# Patient Record
Sex: FEMALE | Race: WHITE | Employment: FULL TIME | URBAN - METROPOLITAN AREA
[De-identification: names, ages, dates, MRNs, and addresses within clinical notes are randomized per-mention and may not be internally consistent; named-entity substitution may affect disease eponyms.]

---

## 2017-02-20 NOTE — H&P
Gynecology History and Physical    Name: Cheryle Rhodes MRN: [id number] SSN: [id number]    YOB: 1985  Age: 27 y.o. Sex: female       Subjective:      Annia Rivas is a 32yo  at 9 weeks gestational age with missed . Presented for NOB visit and US with 6w CRL and no fetal cardiac activity. Returned for f/u US and no interval growth. Tried cytotec but did not experience any bleeding. Have decided to proceed with D&C. OB History     Previous  at term        Medical history: none  Surgical history: none  Social History     Occupational History    teacher     Social History Main Topics    Smoking status: Non-smoker    Smokeless tobacco:     Alcohol use     Drug use:     Sexual activity:      Family history: non-contributory     Allergies not on file  Prior to Admission medications    NKDA        Review of Systems:  A comprehensive review of systems was negative except for that written in the History of Present Illness. Objective: There were no vitals filed for this visit. Physical Exam:  Patient without distress. Heart: Regular rate and rhythm  Lung: clear to auscultation throughout lung fields, no wheezes, no rales, no rhonchi and normal respiratory effort  Abdomen: soft, nontender  External Genitalia: normal general appearance  Urinary system: urethral meatus normal  Vagina: normal mucosa without prolapse or lesions  Cervix: normal appearance  Adnexa: normal bimanual exam  Extr: nontender    Assessment:     Missed  at 10.4 weeks with 6w CRL and enlarged irregular gestational sac    Plan:     Procedure(s) (LRB):  DILATATION AND CURETTAGE WITH SUCTION (N/A)  Discussed the risks of surgery including the risks of bleeding, infection, deep vein thrombosis, and surgical injuries to internal organs including but not limited to the bowels, bladder, rectum, and female reproductive organs. Reviewed risk of uterine perforation.  The patient understands the risks; any and all questions were answered to the patient's satisfaction.     Signed By:  Abdoulaye Richardson MD     February 20, 2017

## 2017-02-21 RX ORDER — SODIUM CHLORIDE, SODIUM LACTATE, POTASSIUM CHLORIDE, CALCIUM CHLORIDE 600; 310; 30; 20 MG/100ML; MG/100ML; MG/100ML; MG/100ML
150 INJECTION, SOLUTION INTRAVENOUS CONTINUOUS
Status: CANCELLED | OUTPATIENT
Start: 2017-02-23 | End: 2017-02-24

## 2017-02-21 RX ORDER — SODIUM CHLORIDE 0.9 % (FLUSH) 0.9 %
5-10 SYRINGE (ML) INJECTION AS NEEDED
Status: CANCELLED | OUTPATIENT
Start: 2017-02-21

## 2017-02-21 RX ORDER — SODIUM CHLORIDE 0.9 % (FLUSH) 0.9 %
5-10 SYRINGE (ML) INJECTION EVERY 8 HOURS
Status: CANCELLED | OUTPATIENT
Start: 2017-02-21

## 2017-02-22 ENCOUNTER — ANESTHESIA EVENT (OUTPATIENT)
Dept: SURGERY | Age: 32
End: 2017-02-22
Payer: COMMERCIAL

## 2017-02-23 ENCOUNTER — HOSPITAL ENCOUNTER (OUTPATIENT)
Age: 32
Setting detail: OUTPATIENT SURGERY
Discharge: HOME | End: 2017-02-23
Attending: OBSTETRICS & GYNECOLOGY | Admitting: OBSTETRICS & GYNECOLOGY
Payer: COMMERCIAL

## 2017-02-23 ENCOUNTER — ANESTHESIA (OUTPATIENT)
Dept: SURGERY | Age: 32
End: 2017-02-23
Payer: COMMERCIAL

## 2017-02-23 ENCOUNTER — SURGERY (OUTPATIENT)
Age: 32
End: 2017-02-23

## 2017-02-23 VITALS
SYSTOLIC BLOOD PRESSURE: 90 MMHG | DIASTOLIC BLOOD PRESSURE: 63 MMHG | WEIGHT: 107 LBS | HEIGHT: 61 IN | BODY MASS INDEX: 20.2 KG/M2 | HEART RATE: 79 BPM | TEMPERATURE: 97.4 F | OXYGEN SATURATION: 100 % | RESPIRATION RATE: 16 BRPM

## 2017-02-23 LAB
BASOPHILS # BLD AUTO: 0 K/UL (ref 0–0.06)
BASOPHILS # BLD: 1 % (ref 0–2)
DIFFERENTIAL METHOD BLD: NORMAL
EOSINOPHIL # BLD: 0.1 K/UL (ref 0–0.4)
EOSINOPHIL NFR BLD: 1 % (ref 0–5)
ERYTHROCYTE [DISTWIDTH] IN BLOOD BY AUTOMATED COUNT: 13 % (ref 11.6–14.5)
HCT VFR BLD AUTO: 39.5 % (ref 35–45)
HGB BLD-MCNC: 13.5 G/DL (ref 12–16)
LYMPHOCYTES # BLD AUTO: 30 % (ref 21–52)
LYMPHOCYTES # BLD: 2.4 K/UL (ref 0.9–3.6)
MCH RBC QN AUTO: 29 PG (ref 24–34)
MCHC RBC AUTO-ENTMCNC: 34.2 G/DL (ref 31–37)
MCV RBC AUTO: 84.8 FL (ref 74–97)
MONOCYTES # BLD: 0.4 K/UL (ref 0.05–1.2)
MONOCYTES NFR BLD AUTO: 5 % (ref 3–10)
NEUTS SEG # BLD: 5 K/UL (ref 1.8–8)
NEUTS SEG NFR BLD AUTO: 63 % (ref 40–73)
PLATELET # BLD AUTO: 369 K/UL (ref 135–420)
PMV BLD AUTO: 9.4 FL (ref 9.2–11.8)
RBC # BLD AUTO: 4.66 M/UL (ref 4.2–5.3)
WBC # BLD AUTO: 8 K/UL (ref 4.6–13.2)

## 2017-02-23 PROCEDURE — 86900 BLOOD TYPING SEROLOGIC ABO: CPT | Performed by: OBSTETRICS & GYNECOLOGY

## 2017-02-23 PROCEDURE — 76010000154 HC OR TIME FIRST 0.5 HR: Performed by: OBSTETRICS & GYNECOLOGY

## 2017-02-23 PROCEDURE — 77030032490 HC SLV COMPR SCD KNE COVD -B: Performed by: OBSTETRICS & GYNECOLOGY

## 2017-02-23 PROCEDURE — 77030020164: Performed by: OBSTETRICS & GYNECOLOGY

## 2017-02-23 PROCEDURE — 77030008577 HC TBNG UTER SUC OCOA -A: Performed by: OBSTETRICS & GYNECOLOGY

## 2017-02-23 PROCEDURE — 77030018842 HC SOL IRR SOD CL 9% BAXT -A: Performed by: OBSTETRICS & GYNECOLOGY

## 2017-02-23 PROCEDURE — 85025 COMPLETE CBC W/AUTO DIFF WBC: CPT | Performed by: OBSTETRICS & GYNECOLOGY

## 2017-02-23 PROCEDURE — 74011250636 HC RX REV CODE- 250/636

## 2017-02-23 PROCEDURE — 74011250636 HC RX REV CODE- 250/636: Performed by: NURSE ANESTHETIST, CERTIFIED REGISTERED

## 2017-02-23 PROCEDURE — 74011250637 HC RX REV CODE- 250/637: Performed by: NURSE ANESTHETIST, CERTIFIED REGISTERED

## 2017-02-23 PROCEDURE — 76210000021 HC REC RM PH II 0.5 TO 1 HR: Performed by: OBSTETRICS & GYNECOLOGY

## 2017-02-23 PROCEDURE — 76210000006 HC OR PH I REC 0.5 TO 1 HR: Performed by: OBSTETRICS & GYNECOLOGY

## 2017-02-23 PROCEDURE — 36415 COLL VENOUS BLD VENIPUNCTURE: CPT | Performed by: OBSTETRICS & GYNECOLOGY

## 2017-02-23 PROCEDURE — 77030012510 HC MSK AIRWY LMA TELE -B: Performed by: ANESTHESIOLOGY

## 2017-02-23 PROCEDURE — 88305 TISSUE EXAM BY PATHOLOGIST: CPT | Performed by: OBSTETRICS & GYNECOLOGY

## 2017-02-23 PROCEDURE — 74011250636 HC RX REV CODE- 250/636: Performed by: OBSTETRICS & GYNECOLOGY

## 2017-02-23 PROCEDURE — 76060000031 HC ANESTHESIA FIRST 0.5 HR: Performed by: OBSTETRICS & GYNECOLOGY

## 2017-02-23 PROCEDURE — 74011000250 HC RX REV CODE- 250

## 2017-02-23 RX ORDER — FENTANYL CITRATE 50 UG/ML
25 INJECTION, SOLUTION INTRAMUSCULAR; INTRAVENOUS AS NEEDED
Status: DISCONTINUED | OUTPATIENT
Start: 2017-02-23 | End: 2017-02-23 | Stop reason: HOSPADM

## 2017-02-23 RX ORDER — FAMOTIDINE 20 MG/1
20 TABLET, FILM COATED ORAL ONCE
Status: COMPLETED | OUTPATIENT
Start: 2017-02-23 | End: 2017-02-23

## 2017-02-23 RX ORDER — SODIUM CHLORIDE 0.9 % (FLUSH) 0.9 %
5-10 SYRINGE (ML) INJECTION EVERY 8 HOURS
Status: DISCONTINUED | OUTPATIENT
Start: 2017-02-23 | End: 2017-02-23 | Stop reason: HOSPADM

## 2017-02-23 RX ORDER — SODIUM CHLORIDE, SODIUM LACTATE, POTASSIUM CHLORIDE, CALCIUM CHLORIDE 600; 310; 30; 20 MG/100ML; MG/100ML; MG/100ML; MG/100ML
25 INJECTION, SOLUTION INTRAVENOUS CONTINUOUS
Status: DISCONTINUED | OUTPATIENT
Start: 2017-02-23 | End: 2017-02-23 | Stop reason: HOSPADM

## 2017-02-23 RX ORDER — KETOROLAC TROMETHAMINE 30 MG/ML
INJECTION, SOLUTION INTRAMUSCULAR; INTRAVENOUS AS NEEDED
Status: DISCONTINUED | OUTPATIENT
Start: 2017-02-23 | End: 2017-02-23 | Stop reason: HOSPADM

## 2017-02-23 RX ORDER — ONDANSETRON 2 MG/ML
INJECTION INTRAMUSCULAR; INTRAVENOUS AS NEEDED
Status: DISCONTINUED | OUTPATIENT
Start: 2017-02-23 | End: 2017-02-23 | Stop reason: HOSPADM

## 2017-02-23 RX ORDER — MIDAZOLAM HYDROCHLORIDE 1 MG/ML
INJECTION, SOLUTION INTRAMUSCULAR; INTRAVENOUS AS NEEDED
Status: DISCONTINUED | OUTPATIENT
Start: 2017-02-23 | End: 2017-02-23 | Stop reason: HOSPADM

## 2017-02-23 RX ORDER — SODIUM CHLORIDE 0.9 % (FLUSH) 0.9 %
5-10 SYRINGE (ML) INJECTION AS NEEDED
Status: DISCONTINUED | OUTPATIENT
Start: 2017-02-23 | End: 2017-02-23 | Stop reason: HOSPADM

## 2017-02-23 RX ORDER — DEXAMETHASONE SODIUM PHOSPHATE 4 MG/ML
INJECTION, SOLUTION INTRA-ARTICULAR; INTRALESIONAL; INTRAMUSCULAR; INTRAVENOUS; SOFT TISSUE AS NEEDED
Status: DISCONTINUED | OUTPATIENT
Start: 2017-02-23 | End: 2017-02-23 | Stop reason: HOSPADM

## 2017-02-23 RX ORDER — SODIUM CHLORIDE, SODIUM LACTATE, POTASSIUM CHLORIDE, CALCIUM CHLORIDE 600; 310; 30; 20 MG/100ML; MG/100ML; MG/100ML; MG/100ML
150 INJECTION, SOLUTION INTRAVENOUS CONTINUOUS
Status: DISCONTINUED | OUTPATIENT
Start: 2017-02-23 | End: 2017-02-23 | Stop reason: HOSPADM

## 2017-02-23 RX ORDER — FENTANYL CITRATE 50 UG/ML
INJECTION, SOLUTION INTRAMUSCULAR; INTRAVENOUS AS NEEDED
Status: DISCONTINUED | OUTPATIENT
Start: 2017-02-23 | End: 2017-02-23 | Stop reason: HOSPADM

## 2017-02-23 RX ORDER — FENTANYL CITRATE 50 UG/ML
50 INJECTION, SOLUTION INTRAMUSCULAR; INTRAVENOUS
Status: DISCONTINUED | OUTPATIENT
Start: 2017-02-23 | End: 2017-02-23 | Stop reason: HOSPADM

## 2017-02-23 RX ORDER — LIDOCAINE HYDROCHLORIDE 20 MG/ML
INJECTION, SOLUTION EPIDURAL; INFILTRATION; INTRACAUDAL; PERINEURAL AS NEEDED
Status: DISCONTINUED | OUTPATIENT
Start: 2017-02-23 | End: 2017-02-23 | Stop reason: HOSPADM

## 2017-02-23 RX ORDER — SODIUM CHLORIDE, SODIUM LACTATE, POTASSIUM CHLORIDE, CALCIUM CHLORIDE 600; 310; 30; 20 MG/100ML; MG/100ML; MG/100ML; MG/100ML
50 INJECTION, SOLUTION INTRAVENOUS CONTINUOUS
Status: DISCONTINUED | OUTPATIENT
Start: 2017-02-23 | End: 2017-02-23 | Stop reason: HOSPADM

## 2017-02-23 RX ORDER — ONDANSETRON 2 MG/ML
4 INJECTION INTRAMUSCULAR; INTRAVENOUS ONCE
Status: DISCONTINUED | OUTPATIENT
Start: 2017-02-23 | End: 2017-02-23 | Stop reason: HOSPADM

## 2017-02-23 RX ORDER — PROPOFOL 10 MG/ML
INJECTION, EMULSION INTRAVENOUS AS NEEDED
Status: DISCONTINUED | OUTPATIENT
Start: 2017-02-23 | End: 2017-02-23 | Stop reason: HOSPADM

## 2017-02-23 RX ADMIN — FENTANYL CITRATE 50 MCG: 50 INJECTION, SOLUTION INTRAMUSCULAR; INTRAVENOUS at 11:49

## 2017-02-23 RX ADMIN — SODIUM CHLORIDE, SODIUM LACTATE, POTASSIUM CHLORIDE, AND CALCIUM CHLORIDE 25 ML/HR: 600; 310; 30; 20 INJECTION, SOLUTION INTRAVENOUS at 11:31

## 2017-02-23 RX ADMIN — SODIUM CHLORIDE: 900 INJECTION, SOLUTION INTRAVENOUS at 11:57

## 2017-02-23 RX ADMIN — DEXAMETHASONE SODIUM PHOSPHATE 4 MG: 4 INJECTION, SOLUTION INTRA-ARTICULAR; INTRALESIONAL; INTRAMUSCULAR; INTRAVENOUS; SOFT TISSUE at 11:54

## 2017-02-23 RX ADMIN — MIDAZOLAM HYDROCHLORIDE 2 MG: 1 INJECTION, SOLUTION INTRAMUSCULAR; INTRAVENOUS at 11:42

## 2017-02-23 RX ADMIN — PROPOFOL 150 MG: 10 INJECTION, EMULSION INTRAVENOUS at 11:49

## 2017-02-23 RX ADMIN — KETOROLAC TROMETHAMINE 30 MG: 30 INJECTION, SOLUTION INTRAMUSCULAR; INTRAVENOUS at 12:02

## 2017-02-23 RX ADMIN — ONDANSETRON 4 MG: 2 INJECTION INTRAMUSCULAR; INTRAVENOUS at 11:54

## 2017-02-23 RX ADMIN — LIDOCAINE HYDROCHLORIDE 60 MG: 20 INJECTION, SOLUTION EPIDURAL; INFILTRATION; INTRACAUDAL; PERINEURAL at 11:49

## 2017-02-23 RX ADMIN — FAMOTIDINE 20 MG: 20 TABLET ORAL at 11:32

## 2017-02-23 RX ADMIN — FENTANYL CITRATE 50 MCG: 50 INJECTION, SOLUTION INTRAMUSCULAR; INTRAVENOUS at 11:56

## 2017-02-23 NOTE — DISCHARGE INSTRUCTIONS
Call Dr Hooper [place] [phone] if bleeding through more that 4 pads an hour, fever higher than 101 or abdominal pain with nausea and vomiting. Dilation and Curettage: What to Expect at Home  Your Recovery  Dilation and curettage (D&C) is a procedure to remove tissue from the inside of the uterus. The doctor used a curved tool, called a curette, to gently scrape tissue from your uterus. You are likely to have a backache, or cramps similar to menstrual cramps, and pass small clots of blood from your vagina for the first few days. You may continue to have light vaginal bleeding for several weeks after the procedure. You will probably be able to go back to most of your normal activities in 1 or 2 days. This care sheet gives you a general idea about how long it will take for you to recover. But each person recovers at a different pace. Follow the steps below to get better as quickly as possible. How can you care for yourself at home? Activity  · Rest when you feel tired. Getting enough sleep will help you recover. · Avoid strenuous activities, such as bicycle riding, jogging, weight lifting, or aerobic exercise, until your doctor says it is okay. · Most women are able to return to work the day after the procedure. · You may have some light vaginal bleeding. Wear sanitary pads if needed. Do not douche or use tampons for 2 weeks or until your doctor says it is okay. · Ask your doctor when it is okay for you to have sex. · If you could become pregnant, talk about birth control with your doctor. Do not try to become pregnant until your doctor says it is okay. Diet  · You can eat your normal diet. If your stomach is upset, try bland, low-fat foods like plain rice, broiled chicken, toast, and yogurt. · Drink plenty of fluids (unless your doctor tells you not to). Medicines  · Your doctor will tell you if and when you can restart your medicines.  He or she will also give you instructions about taking any new medicines. · If you take blood thinners, such as warfarin (Coumadin), clopidogrel (Plavix), or aspirin, be sure to talk to your doctor. He or she will tell you if and when to start taking those medicines again. Make sure that you understand exactly what your doctor wants you to do. · Be safe with medicines. Take pain medicines exactly as directed. ¨ If the doctor gave you a prescription medicine for pain, take it as prescribed. ¨ If you are not taking a prescription pain medicine, ask your doctor if you can take an over-the-counter medicine. · If you think your pain medicine is making you sick to your stomach:  ¨ Take your medicine after meals (unless your doctor has told you not to). ¨ Ask your doctor for a different pain medicine. · If your doctor prescribed antibiotics, take them as directed. Do not stop taking them just because you feel better. You need to take the full course of antibiotics. Follow-up care is a key part of your treatment and safety. Be sure to make and go to all appointments, and call your doctor if you are having problems. It's also a good idea to know your test results and keep a list of the medicines you take. When should you call for help? Call 911 anytime you think you may need emergency care. For example, call if:  · You passed out (lost consciousness). · You have severe trouble breathing. · You have chest pain and shortness of breath, or you cough up blood. · You have severe pain in your belly. Call your doctor now or seek immediate medical care if:  · You have bright red vaginal bleeding that soaks one or more pads each hour for 2 or more hours. · You pass blood clots that are larger than a golf ball. · You have vaginal discharge that smells bad. · You are sick to your stomach or cannot keep fluids down. · You have pain that does not get better after you take pain medicine. · You have pain that is getting worse 2 days after the procedure.   · You have a fever over 100°F.  · Your belly feels tender, or full and hard. Watch closely for changes in your health, and be sure to contact your doctor if:  · You do not get better as expected. Where can you learn more? Go to http://gene-[facility].info/. Enter [phone] in the search box to learn more about \"Dilation and Curettage: What to Expect at Home. \"  Current as of: May 30, 2016  Content Version: 11.1  © 7038-3831 [employer]. Care instructions adapted under license by [employer] (which disclaims liability or warranty for this information). If you have questions about a medical condition or this instruction, always ask your healthcare professional. [place] [address] any warranty or liability for your use of this information. DISCHARGE SUMMARY from Nurse    The following personal items are in your possession at time of discharge:    Dental Appliances: None  Visual Aid: Contacts     Home Medications: None  Jewelry: None  Clothing: Pants, Shirt, Footwear, Socks, Jacket/Coat, Undergarments  Other Valuables: Contact Lenses (all belongings given to , Branden Nunez)             PATIENT INSTRUCTIONS:    After general anesthesia or intravenous sedation, for 24 hours or while taking prescription Narcotics:  · Limit your activities  · Do not drive and operate hazardous machinery  · Do not make important personal or business decisions  · Do  not drink alcoholic beverages  · If you have not urinated within 8 hours after discharge, please contact your surgeon on call.     Report the following to your surgeon:  · Excessive pain, swelling, redness or odor of or around the surgical area  · Temperature over 100.5  · Nausea and vomiting lasting longer than 4 hours or if unable to take medications  · Any signs of decreased circulation or nerve impairment to extremity: change in color, persistent  numbness, tingling, coldness or increase pain  · Any questions        What to do at Home:  These are general instructions for a healthy lifestyle:    No smoking/ No tobacco products/ Avoid exposure to second hand smoke    Surgeon General's Warning:  Quitting smoking now greatly reduces serious risk to your health. Obesity, smoking, and sedentary lifestyle greatly increases your risk for illness    A healthy diet, regular physical exercise & weight monitoring are important for maintaining a healthy lifestyle    You may be retaining fluid if you have a history of heart failure or if you experience any of the following symptoms:  Weight gain of 3 pounds or more overnight or 5 pounds in a week, increased swelling in our hands or feet or shortness of breath while lying flat in bed. Please call your doctor as soon as you notice any of these symptoms; do not wait until your next office visit. Recognize signs and symptoms of STROKE:    F-face looks uneven    A-arms unable to move or move unevenly    S-speech slurred or non-existent    T-time-call 911 as soon as signs and symptoms begin-DO NOT go       Back to bed or wait to see if you get better-TIME IS BRAIN. Warning Signs of HEART ATTACK     Call 911 if you have these symptoms:   Chest discomfort. Most heart attacks involve discomfort in the center of the chest that lasts more than a few minutes, or that goes away and comes back. It can feel like uncomfortable pressure, squeezing, fullness, or pain.  Discomfort in other areas of the upper body. Symptoms can include pain or discomfort in one or both arms, the back, neck, jaw, or stomach.  Shortness of breath with or without chest discomfort.  Other signs may include breaking out in a cold sweat, nausea, or lightheadedness. Don't wait more than five minutes to call 911 - MINUTES MATTER! Fast action can save your life. Calling 911 is almost always the fastest way to get lifesaving treatment.  Emergency Medical Services staff can begin treatment when they arrive -- up to an hour sooner than if someone gets to the hospital by car. The discharge information has been reviewed with the patient. The patient verbalized understanding. Discharge medications reviewed with the patient and appropriate educational materials and side effects teaching were provided. Patient armband removed and given to patient to take home.   Patient was informed of the privacy risks if armband lost or stolen

## 2017-02-23 NOTE — OP NOTES
OPERATIVE REPORT      PREOP DIAGNOSIS: Missed  at 10 weeks  POSTOP DIAGNOSIS: Same  PROCEDURE: Suction D&C  SURGEON: Lea Morocho MD  ASSISTANT: none  EBL: 100 cc  IVFluids:500cc  ANESTHESIA: general anesthesia  FINDINGS: 10w size uterus with products of conception  DISPOSITION: stable to recovery    PROCEDURE:   Patient was taken to the OR after informed consent had been obtained. Surgery identification was completed with the patient and the OR team. She was then placed in the dorsal lithotomy position. She was prepped and draped  in a sterile fashion. A speculum was placed in the posterior vagina. The anterior lip of the cervix was grasped with an allis clamp. The uterus was sounded to 10 cm. The cervix was serially dilated with Teresa dilators. An 8mm suction curette was advanced into the uterine cavity and all products of conception removed. Sharp curettage was performed. The suction curette was then advanced again into the uterus and all remaining clot and debris removed. The allis was removed. There was good hemostasis.  At the end of the procedure all sponge, lap, needle and instrument counts were correct x 2.       Lea Morocho MD  2017

## 2017-02-23 NOTE — ANESTHESIA POSTPROCEDURE EVALUATION
Post-Anesthesia Evaluation and Assessment    Patient: Butch Montanez MRN: [id number]  SSN: [id number]    YOB: 1985  Age: 27 y.o. Sex: female       Cardiovascular Function/Vital Signs  Visit Vitals    BP (!) 89/56    Pulse 81    Temp 36.3 °C (97.4 °F)    Resp 14    Ht 5' 1\" (1.549 m)    Wt 48.5 kg (107 lb)    SpO2 100%    BMI 20.22 kg/m2       Patient is status post general anesthesia for Procedure(s):  DILATATION AND CURETTAGE WITH SUCTION. Nausea/Vomiting: None    Postoperative hydration reviewed and adequate. Pain:  Pain Scale 1: Numeric (0 - 10) (02/23/17 1228)  Pain Intensity 1: 0 (02/23/17 1228)   Managed    Neurological Status:   Neuro (WDL): Within Defined Limits (02/23/17 1228)  Neuro  Neurologic State: Drowsy (02/23/17 1209)   At baseline    Mental Status and Level of Consciousness: Alert and oriented     Pulmonary Status:   O2 Device: Room air (02/23/17 1228)   Adequate oxygenation and airway patent    Complications related to anesthesia: None    Post-anesthesia assessment completed.  No concerns    Signed By: Royer Beth CRNA     February 23, 2017

## 2017-02-23 NOTE — ANESTHESIA PREPROCEDURE EVALUATION
Anesthetic History   No history of anesthetic complications            Review of Systems / Medical History  Patient summary reviewed and pertinent labs reviewed    Pulmonary  Within defined limits                 Neuro/Psych   Within defined limits           Cardiovascular  Within defined limits                     GI/Hepatic/Renal  Within defined limits              Endo/Other  Within defined limits           Other Findings   Comments: Current Smoker? NO       Elective Surgery? Yes       Abstained from smoking 24 hours prior to anesthesia? N/A    Risk Factors for Postoperative nausea/vomiting:       History of postoperative nausea/vomiting? NO       Female? YES       Motion sickness? NO       Intended opioid administration for postoperative analgesia?   YES           Physical Exam    Airway  Mallampati: II  TM Distance: 4 - 6 cm  Neck ROM: normal range of motion   Mouth opening: Normal     Cardiovascular  Regular rate and rhythm,  S1 and S2 normal,  no murmur, click, rub, or gallop             Dental  No notable dental hx       Pulmonary                 Abdominal  Abdominal exam normal       Other Findings            Anesthetic Plan    ASA: 1  Anesthesia type: general          Induction: Intravenous  Anesthetic plan and risks discussed with: Patient

## 2017-02-23 NOTE — PERIOP NOTES
1209  Received pt. Connected pt to monitor. VSS. Assessment preformed. RN at bedside. Will continue to monitor. 1226   Called to waiting room, spoke to Chetna Nunez  - pt  - pt id number verified. Update given on pt status.

## 2017-02-23 NOTE — INTERVAL H&P NOTE
H&P Update:  Myranda Bartltet was seen and examined. History and physical has been reviewed. The patient has been examined.  There have been no significant clinical changes since the completion of the originally dated History and Physical.    Signed By: Nadia Gillespie MD     February 23, 2017 11:36 AM

## 2017-02-23 NOTE — IP AVS SNAPSHOT
Ilia Shed 
 
 
 [address] 
[phone] Patient: Guilherme Vieira MRN: [id number] :1985 You are allergic to the following No active allergies Recent Documentation Height  
  
  
  
  
  
 1.549 m Unresulted Labs Order Current Status TYPE & SCREEN Preliminary result Emergency Contacts Name Discharge Info Relation Home Work Mobile Gabe Higginbotham DISCHARGE CAREGIVER [3] Spouse [3]   [phone] About your hospitalization You were admitted on:  2017 [facility] You were discharged on:  2017 Unit phone number:  [phone] Why you were hospitalized Your primary diagnosis was:  Not on File Providers Seen During Your Hospitalizations Provider Role Specialty Primary office phone Lorraine Quintana MD Attending Provider Obstetrics & Gynecology [phone] Your Primary Care Physician (PCP) Primary Care Physician Office Phone Office Fax NONE ** None ** ** None ** Follow-up Information Follow up With Details Comments Contact Info None   None (395) Patient stated that they have no PCP Current Discharge Medication List  
  
Notice You have not been prescribed any medications. Discharge Instructions Call Dr Panda Giordano [phone] if bleeding through more that 4 pads an hour, fever higher than 101 or abdominal pain with nausea and vomiting. Dilation and Curettage: What to Expect at [facility] Your Recovery Dilation and curettage (D&C) is a procedure to remove tissue from the inside of the uterus. The doctor used a curved tool, called a curette, to gently scrape tissue from your uterus. You are likely to have a backache, or cramps similar to menstrual cramps, and pass small clots of blood from your vagina for the first few days.  You may continue to have light vaginal bleeding for several weeks after the procedure. You will probably be able to go back to most of your normal activities in 1 or 2 days. This care sheet gives you a general idea about how long it will take for you to recover. But each person recovers at a different pace. Follow the steps below to get better as quickly as possible. How can you care for yourself at home? Activity · Rest when you feel tired. Getting enough sleep will help you recover. · Avoid strenuous activities, such as bicycle riding, jogging, weight lifting, or aerobic exercise, until your doctor says it is okay. · Most women are able to return to work the day after the procedure. · You may have some light vaginal bleeding. Wear sanitary pads if needed. Do not douche or use tampons for 2 weeks or until your doctor says it is okay. · Ask your doctor when it is okay for you to have sex. · If you could become pregnant, talk about birth control with your doctor. Do not try to become pregnant until your doctor says it is okay. Diet · You can eat your normal diet. If your stomach is upset, try bland, low-fat foods like plain rice, broiled chicken, toast, and yogurt. · Drink plenty of fluids (unless your doctor tells you not to). Medicines · Your doctor will tell you if and when you can restart your medicines. He or she will also give you instructions about taking any new medicines. · If you take blood thinners, such as warfarin (Coumadin), clopidogrel (Plavix), or aspirin, be sure to talk to your doctor. He or she will tell you if and when to start taking those medicines again. Make sure that you understand exactly what your doctor wants you to do. · Be safe with medicines. Take pain medicines exactly as directed. ¨ If the doctor gave you a prescription medicine for pain, take it as prescribed.  
¨ If you are not taking a prescription pain medicine, ask your doctor if you can take an over-the-counter medicine. · If you think your pain medicine is making you sick to your stomach: 
¨ Take your medicine after meals (unless your doctor has told you not to). ¨ Ask your doctor for a different pain medicine. · If your doctor prescribed antibiotics, take them as directed. Do not stop taking them just because you feel better. You need to take the full course of antibiotics. Follow-up care is a key part of your treatment and safety. Be sure to make and go to all appointments, and call your doctor if you are having problems. It's also a good idea to know your test results and keep a list of the medicines you take. When should you call for help? Call 911 anytime you think you may need emergency care. For example, call if: 
· You passed out (lost consciousness). · You have severe trouble breathing. · You have chest pain and shortness of breath, or you cough up blood. · You have severe pain in your belly. Call your doctor now or seek immediate medical care if: 
· You have bright red vaginal bleeding that soaks one or more pads each hour for 2 or more hours. · You pass blood clots that are larger than a golf ball. · You have vaginal discharge that smells bad. · You are sick to your stomach or cannot keep fluids down. · You have pain that does not get better after you take pain medicine. · You have pain that is getting worse 2 days after the procedure. · You have a fever over 100°F. 
· Your belly feels tender, or full and hard. Watch closely for changes in your health, and be sure to contact your doctor if: 
· You do not get better as expected. Where can you learn more? Go to http://gene-layton.info/. Enter [phone] in the search box to learn more about \"Dilation and Curettage: What to Expect at Home. \" Current as of: May 30, 2016 Content Version: 11.1 © 6569-6068 [employer].  Care instructions adapted under license by [address] (which disclaims liability or warranty for this information). If you have questions about a medical condition or this instruction, always ask your healthcare professional. [address] any warranty or liability for your use of this information. DISCHARGE SUMMARY from Nurse The following personal items are in your possession at time of discharge: 
 
Dental Appliances: None Visual Aid: Contacts Home Medications: None Jewelry: None Clothing: Pants, Shirt, Footwear, Socks, Jacket/Coat, Undergarments Other Valuables: Contact Lenses (all belongings given to , Delsie Locks) PATIENT INSTRUCTIONS: 
 
After general anesthesia or intravenous sedation, for 24 hours or while taking prescription Narcotics: · Limit your activities · Do not drive and operate hazardous machinery · Do not make important personal or business decisions · Do  not drink alcoholic beverages · If you have not urinated within 8 hours after discharge, please contact your surgeon on call. Report the following to your surgeon: 
· Excessive pain, swelling, redness or odor of or around the surgical area · Temperature over 100.5 · Nausea and vomiting lasting longer than 4 hours or if unable to take medications · Any signs of decreased circulation or nerve impairment to extremity: change in color, persistent  numbness, tingling, coldness or increase pain · Any questions What to do at Home: These are general instructions for a healthy lifestyle: No smoking/ No tobacco products/ Avoid exposure to second hand smoke Surgeon General's Warning:  Quitting smoking now greatly reduces serious risk to your health. Obesity, smoking, and sedentary lifestyle greatly increases your risk for illness A healthy diet, regular physical exercise & weight monitoring are important for maintaining a healthy lifestyle You may be retaining fluid if you have a history of heart failure or if you experience any of the following symptoms:  Weight gain of 3 pounds or more overnight or 5 pounds in a week, increased swelling in our hands or feet or shortness of breath while lying flat in bed. Please call your doctor as soon as you notice any of these symptoms; do not wait until your next office visit. Recognize signs and symptoms of STROKE: 
 
F-face looks uneven A-arms unable to move or move unevenly S-speech slurred or non-existent T-time-call 911 as soon as signs and symptoms begin-DO NOT go Back to bed or wait to see if you get better-TIME IS BRAIN. Warning Signs of HEART ATTACK Call 911 if you have these symptoms: 
? Chest discomfort. Most heart attacks involve discomfort in the center of the chest that lasts more than a few minutes, or that goes away and comes back. It can feel like uncomfortable pressure, squeezing, fullness, or pain. ? Discomfort in other areas of the upper body. Symptoms can include pain or discomfort in one or both arms, the back, neck, jaw, or stomach. ? Shortness of breath with or without chest discomfort. ? Other signs may include breaking out in a cold sweat, nausea, or lightheadedness. Don't wait more than five minutes to call [address]! Fast action can save your life. Calling 911 is almost always the fastest way to get lifesaving treatment. Emergency Medical Services staff can begin treatment when they arrive  up to an hour sooner than if someone gets to the hospital by car. The discharge information has been reviewed with the patient. The patient verbalized understanding. Discharge medications reviewed with the patient and appropriate educational materials and side effects teaching were provided. Patient armband removed and given to patient to take home. Patient was informed of the privacy risks if armband lost or stolen Discharge Orders None Introducing [address]! Viridiana Garcia introduces [employer] patient portal. Now you can access parts of your medical record, email your doctor's office, and request medication refills online. 1. In your internet browser, go to [email or web address] 2. Click on the First Time User? Click Here link in the Sign In box. You will see the New Member Sign Up page. 3. Enter your [employer] Access Code exactly as it appears below. You will not need to use this code after youve completed the sign-up process. If you do not sign up before the expiration date, you must request a new code. · [employer] Access Code: [email or web address] Expires: 5/21/2017  4:09 PM 
 
4. Enter the last four digits of your Social Security Number (xxxx) and Date of Birth (mm/dd/yyyy) as indicated and click Submit. You will be taken to the next sign-up page. 5. Create a [employer] ID. This will be your [employer] login ID and cannot be changed, so think of one that is secure and easy to remember. 6. Create a [employer] password. You can change your password at any time. 7. Enter your Password Reset Question and Answer. This can be used at a later time if you forget your password. 8. Enter your e-mail address. You will receive e-mail notification when new information is available in [address] 9. Click Sign Up. You can now view and download portions of your medical record. 10. Click the Download Summary menu link to download a portable copy of your medical information. If you have questions, please visit the Frequently Asked Questions section of the [employer] website. Remember, [employer] is NOT to be used for urgent needs. For medical emergencies, dial 911. Now available from your iPhone and Android! General Information Please provide this summary of care documentation to your next provider.  
  
  
    
    
 Patient Signature: ____________________________________________________________ Date:  ____________________________________________________________  
  
Scharlene Alosa Provider Signature:  ____________________________________________________________ Date:  ____________________________________________________________

## 2017-02-23 NOTE — IP AVS SNAPSHOT
Summary of Care Report The Summary of Care report has been created to help improve care coordination. Users with access to [employer] or [employer] (Web-based application) may access additional patient information including the Discharge Summary. If you are not currently a [address] user and need more information, please call the number listed below in the [place] [address] section and ask to be connected with Medical Records. Facility Information Name Address Phone [facility] [address] [facility] [address] [phone] [phone] Patient Information Patient Name Sex  Helen Robison ([id number]) Female 1985 Discharge Information Admitting Provider Service Area Unit Tae Villanueva MD / Lauren Fabian [address] Phase 2 Recovery / [phone] Discharge Provider Discharge Date/Time Discharge Disposition Destination (none) 2017 Afternoon (Pending) AHR (none) Patient Language Language ENGLISH [13] Problem List as of 2017  Date Reviewed: 2017 None You are allergic to the following No active allergies Current Discharge Medication List  
  
Notice You have not been prescribed any medications. Surgery Information ID Date/Time Status Primary Surgeon All Procedures Location [id number] 2017 [address], MD DILATATION AND CURETTAGE WITH SUCTION [facility] MAIN OR Follow-up Information Follow up With Details Comments Contact Info None   None (395) Patient stated that they have no PCP Discharge Instructions Call Dr Aldo Latif [phone] if bleeding through more that 4 pads an hour, fever higher than 101 or abdominal pain with nausea and vomiting. Dilation and Curettage: What to Expect at [facility] Your Recovery Dilation and curettage (D&C) is a procedure to remove tissue from the inside of the uterus. The doctor used a curved tool, called a curette, to gently scrape tissue from your uterus. You are likely to have a backache, or cramps similar to menstrual cramps, and pass small clots of blood from your vagina for the first few days. You may continue to have light vaginal bleeding for several weeks after the procedure. You will probably be able to go back to most of your normal activities in 1 or 2 days. This care sheet gives you a general idea about how long it will take for you to recover. But each person recovers at a different pace. Follow the steps below to get better as quickly as possible. How can you care for yourself at home? Activity · Rest when you feel tired. Getting enough sleep will help you recover. · Avoid strenuous activities, such as bicycle riding, jogging, weight lifting, or aerobic exercise, until your doctor says it is okay. · Most women are able to return to work the day after the procedure. · You may have some light vaginal bleeding. Wear sanitary pads if needed. Do not douche or use tampons for 2 weeks or until your doctor says it is okay. · Ask your doctor when it is okay for you to have sex. · If you could become pregnant, talk about birth control with your doctor. Do not try to become pregnant until your doctor says it is okay. Diet · You can eat your normal diet. If your stomach is upset, try bland, low-fat foods like plain rice, broiled chicken, toast, and yogurt. · Drink plenty of fluids (unless your doctor tells you not to). Medicines · Your doctor will tell you if and when you can restart your medicines. He or she will also give you instructions about taking any new medicines. · If you take blood thinners, such as warfarin (Coumadin), clopidogrel (Plavix), or aspirin, be sure to talk to your doctor.  He or she will tell you if and when to start taking those medicines again. Make sure that you understand exactly what your doctor wants you to do. · Be safe with medicines. Take pain medicines exactly as directed. ¨ If the doctor gave you a prescription medicine for pain, take it as prescribed. ¨ If you are not taking a prescription pain medicine, ask your doctor if you can take an over-the-counter medicine. · If you think your pain medicine is making you sick to your stomach: 
¨ Take your medicine after meals (unless your doctor has told you not to). ¨ Ask your doctor for a different pain medicine. · If your doctor prescribed antibiotics, take them as directed. Do not stop taking them just because you feel better. You need to take the full course of antibiotics. Follow-up care is a key part of your treatment and safety. Be sure to make and go to all appointments, and call your doctor if you are having problems. It's also a good idea to know your test results and keep a list of the medicines you take. When should you call for help? Call 911 anytime you think you may need emergency care. For example, call if: 
· You passed out (lost consciousness). · You have severe trouble breathing. · You have chest pain and shortness of breath, or you cough up blood. · You have severe pain in your belly. Call your doctor now or seek immediate medical care if: 
· You have bright red vaginal bleeding that soaks one or more pads each hour for 2 or more hours. · You pass blood clots that are larger than a golf ball. · You have vaginal discharge that smells bad. · You are sick to your stomach or cannot keep fluids down. · You have pain that does not get better after you take pain medicine. · You have pain that is getting worse 2 days after the procedure. · You have a fever over 100°F. 
· Your belly feels tender, or full and hard. Watch closely for changes in your health, and be sure to contact your doctor if: · You do not get better as expected. Where can you learn more? Go to http://gene-layton.info/. Enter [phone] in the search box to learn more about \"Dilation and Curettage: What to Expect at Home. \" Current as of: May 30, 2016 Content Version: 11.1 © 2006-2016 [employer]. Care instructions adapted under license by [employer] (which disclaims liability or warranty for this information). If you have questions about a medical condition or this instruction, always ask your healthcare professional. [place] [address] any warranty or liability for your use of this information. DISCHARGE SUMMARY from Nurse The following personal items are in your possession at time of discharge: 
 
Dental Appliances: None Visual Aid: Contacts Home Medications: None Jewelry: None Clothing: Pants, Shirt, Footwear, Socks, Jacket/Coat, Undergarments Other Valuables: Contact Lenses (all belongings given to , Barnet Settles) PATIENT INSTRUCTIONS: 
 
After general anesthesia or intravenous sedation, for 24 hours or while taking prescription Narcotics: · Limit your activities · Do not drive and operate hazardous machinery · Do not make important personal or business decisions · Do  not drink alcoholic beverages · If you have not urinated within 8 hours after discharge, please contact your surgeon on call. Report the following to your surgeon: 
· Excessive pain, swelling, redness or odor of or around the surgical area · Temperature over 100.5 · Nausea and vomiting lasting longer than 4 hours or if unable to take medications · Any signs of decreased circulation or nerve impairment to extremity: change in color, persistent  numbness, tingling, coldness or increase pain · Any questions What to do at Home: These are general instructions for a healthy lifestyle: No smoking/ No tobacco products/ Avoid exposure to second hand smoke Surgeon General's Warning:  Quitting smoking now greatly reduces serious risk to your health. Obesity, smoking, and sedentary lifestyle greatly increases your risk for illness A healthy diet, regular physical exercise & weight monitoring are important for maintaining a healthy lifestyle You may be retaining fluid if you have a history of heart failure or if you experience any of the following symptoms:  Weight gain of 3 pounds or more overnight or 5 pounds in a week, increased swelling in our hands or feet or shortness of breath while lying flat in bed. Please call your doctor as soon as you notice any of these symptoms; do not wait until your next office visit. Recognize signs and symptoms of STROKE: 
 
F-face looks uneven A-arms unable to move or move unevenly S-speech slurred or non-existent T-time-call 911 as soon as signs and symptoms begin-DO NOT go Back to bed or wait to see if you get better-TIME IS BRAIN. Warning Signs of HEART ATTACK Call 911 if you have these symptoms: 
? Chest discomfort. Most heart attacks involve discomfort in the center of the chest that lasts more than a few minutes, or that goes away and comes back. It can feel like uncomfortable pressure, squeezing, fullness, or pain. ? Discomfort in other areas of the upper body. Symptoms can include pain or discomfort in one or both arms, the back, neck, jaw, or stomach. ? Shortness of breath with or without chest discomfort. ? Other signs may include breaking out in a cold sweat, nausea, or lightheadedness. Don't wait more than five minutes to call [address]! Fast action can save your life. Calling 911 is almost always the fastest way to get lifesaving treatment. Emergency Medical Services staff can begin treatment when they arrive  up to an hour sooner than if someone gets to the hospital by car. The discharge information has been reviewed with the patient.   The patient verbalized understanding. Discharge medications reviewed with the patient and appropriate educational materials and side effects teaching were provided. Patient armband removed and given to patient to take home. Patient was informed of the privacy risks if armband lost or stolen Chart Review Routing History No Routing History on File

## 2017-02-24 LAB
ABO + RH BLD: NORMAL
BLOOD GROUP ANTIBODIES SERPL: NORMAL
SPECIMEN EXP DATE BLD: NORMAL

## 2017-12-07 ENCOUNTER — HOSPITAL ENCOUNTER (EMERGENCY)
Age: 32
Discharge: HOME | End: 2017-12-07
Attending: OBSTETRICS & GYNECOLOGY | Admitting: OBSTETRICS & GYNECOLOGY
Payer: COMMERCIAL

## 2017-12-07 ENCOUNTER — HOSPITAL ENCOUNTER (EMERGENCY)
Age: 32
Discharge: HOME | End: 2017-12-07
Attending: EMERGENCY MEDICINE
Payer: COMMERCIAL

## 2017-12-07 VITALS — BODY MASS INDEX: 25.49 KG/M2 | RESPIRATION RATE: 16 BRPM | WEIGHT: 135 LBS | HEIGHT: 61 IN | TEMPERATURE: 98 F

## 2017-12-07 VITALS
HEART RATE: 83 BPM | WEIGHT: 135 LBS | SYSTOLIC BLOOD PRESSURE: 121 MMHG | TEMPERATURE: 98 F | HEIGHT: 61 IN | OXYGEN SATURATION: 100 % | DIASTOLIC BLOOD PRESSURE: 73 MMHG | RESPIRATION RATE: 15 BRPM | BODY MASS INDEX: 25.49 KG/M2
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
## 2017-12-07 PROBLEM — Z34.90 PREGNANT: Status: ACTIVE | Noted: 2017-12-07
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
## 2017-12-07 PROBLEM — Z34.90 PREGNANCY: Status: ACTIVE | Noted: 2017-12-07

## 2017-12-07 PROCEDURE — 59025 FETAL NON-STRESS TEST: CPT

## 2017-12-07 PROCEDURE — 99218 HC RM OBSERVATION: CPT

## 2017-12-07 PROCEDURE — 99283 EMERGENCY DEPT VISIT LOW MDM: CPT

## 2017-12-07 RX ORDER — LEVOTHYROXINE SODIUM 50 UG/1
TABLET ORAL
COMMUNITY

## 2017-12-07 NOTE — PROGRESS NOTES
Reviewed discharge instructions, verbalizes understanding . Denies questions, problems or c/o. Discharged to home.

## 2017-12-07 NOTE — ED NOTES
I have reviewed discharge instructions with the patient. The patient verbalized understanding. Pt transported to L&D via wheelchair by ER tech for continuous fetal monitoring. L&D aware pt is on her way.

## 2017-12-07 NOTE — PROGRESS NOTES
VE thick/ closed/ high. No contractions noted on monitor or per patient. D/C to home. Discussed that some soreness if normal. To come back if starts tej, bleeding, or decreased FM. Has appt in office on Tuesday. DR. Sharri Stanford updated.      Keshia Alston CNM

## 2017-12-07 NOTE — IP AVS SNAPSHOT
Liliane Ray 
 
 
 [address] [place] [address] Patient: Elmer Booker MRN: [id number] :1985 My Medications ASK your physician about these medications Instructions Each Dose to Equal  
 Morning Noon Evening Bedtime  
 levothyroxine 50 mcg tablet Commonly known as:  SYNTHROID Your last dose was: Your next dose is: Take  by mouth Daily (before breakfast). PNV38-Iron Cbn&Gluc-FA-DSS-DHA 35-1- mg Cmpk Your last dose was: Your next dose is: Take  by mouth.
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
## 2018-01-05 ENCOUNTER — HOSPITAL ENCOUNTER (OUTPATIENT)
Age: 33
Discharge: HOME | End: 2018-01-05
Attending: OBSTETRICS & GYNECOLOGY | Admitting: SPECIALIST
Payer: COMMERCIAL

## 2018-01-05 VITALS — TEMPERATURE: 98.6 F | SYSTOLIC BLOOD PRESSURE: 115 MMHG | HEART RATE: 85 BPM | DIASTOLIC BLOOD PRESSURE: 86 MMHG
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
## 2018-01-05 PROBLEM — Z34.90 PREGNANCY: Status: RESOLVED | Noted: 2017-12-07 | Resolved: 2018-01-05

## 2018-01-05 PROBLEM — E03.9 HYPOTHYROID: Status: ACTIVE | Noted: 2018-01-05

## 2018-01-05 PROBLEM — Z34.90 PREGNANT: Status: RESOLVED | Noted: 2017-12-07 | Resolved: 2018-01-05
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
## 2018-01-05 PROCEDURE — 59025 FETAL NON-STRESS TEST: CPT

## 2018-01-05 PROCEDURE — 99218 HC RM OBSERVATION: CPT

## 2018-01-05 NOTE — PROGRESS NOTES
1430 Received  post dates scheduled nst for post dates. Denies contractions, no vaginal bleeding, positive fetal movements. [address] in [facility] 3cm . Patient given return office visit scheduled  at [phone] with Dr Gregor Sierra. 0 Patien/supportive  discharged to home with labor precautions, voiced understanding to keep scheduled appointment Tuesday.

## 2018-01-05 NOTE — H&P
History & Physical  IDENTIFYING DATA  Patient's Name:  Taran Montoya. MRN: [id number]. : 1985. Date of Service:  2018  3:38 PM  Physician:  Juan Alberto English CNM    IMPRESSION:    Indication:   Taran Montoya is a  34 y.o. pregnant patient at 40w5d weeks. Estimated Date of Delivery: 17     Postdates NST- reactive   SVE 3 /70/-1   Requested to swept membranes      RECOMMENDATIONS:    D/c home              Subjective:   Chief complaint:  Postdates NST   OB History      Para Term  AB Living    3 1   1 1    SAB TAB Ectopic Molar Multiple Live Births    1               OB HISTORY:    OB History      Para Term  AB Living    3 1   1 1    SAB TAB Ectopic Molar Multiple Live Births    1               PAST MEDICAL HISTORY:   Past Medical History:   Diagnosis Date    Thyroid activity decreased        PAST SURGICAL HISTORY: No past surgical history on file. SOCIAL HISTORY:    Social History     Social History    Marital status:      Spouse name: N/A    Number of children: N/A    Years of education: N/A     Occupational History    Not on file. Social History Main Topics    Smoking status: Never Smoker    Smokeless tobacco: Never Used    Alcohol use No    Drug use: No    Sexual activity: Yes     Birth control/ protection: None     Other Topics Concern    Not on file     Social History Narrative       FAMILY HISTORY  No family history on file. ALLERGY:  No Known Allergies    HOME MEDICATIONS:   Prior to Admission medications    Medication Sig Start Date End Date Taking? Authorizing Provider   levothyroxine (SYNTHROID) 50 mcg tablet Take  by mouth Daily (before breakfast). Glenda Brooks MD   PNV38-Iron Cbn&Gluc-FA-DSS-DHA 35-1- mg cmpk Take  by mouth. Glenda Brooks MD        Cervical Exam  Dilation (cm): 3    FETAL MONITORING:  Baseline FHR: Patient Vitals for the past 2 hrs:    Mode Fetal Heart Rate Fetal Activity Variability Decelerations Accelerations Non Stress Test   01/05/18 1512 External 150 Present 6-25 BPM None - Reactive   01/05/18 1429 External 145 Present 6-25 BPM None Yes -       REVIEW OF SYMPTOMS:   Constitutional: fever, chills denied. Head, Ears, Nose Throat:   Ear pain denied. Sore throat denied. Eyes: Pain denied. Visual disturbance denied. Respiratory: Cough denied. Shortness of breath denied. Cardiovascular: Chest pains denied. Gastrointestinal: Nausea, Vomiting, Diarrhea, Constipation denied. Genitourinary: Vaginal Bleeding, Vaginal Odor, Painful Urination, Blood in Urine denied. Pain over kidneys denied. Musculoskeletal: Back pain, Joint pain denied. Skin:  Rash denied. Injuries denied. Neurological:  Headaches, Dizziness, Seizures denied. Psychiatric/Behavioral: Major mood problems denied. Objective:     Physical Exam:     Visit Vitals    /86    Pulse 85    Temp 98.6 °F (37 °C)     General appearance:   Alert, oriented to person, place, and time, cooperative, no distress, appears stated age and is well-developed and well-nourished. Head, Nose, Throat: Normocephalic, atraumatic. Eyes: Conjunctivae appear normal.  Pupils are equal and round. Neck: Normal range of motion. Supple. Heart:  Regular rate and Rhythm. Lungs: Effort normal, No apparent respiratory distress, Wheezes or Cough. Abdomen: Term pregnancy appropriate size. Abnormal tenderness not detected. Scars None detected. External genitalia: normal appearance without obvious lesions. Bartholin's,  [place]'s, Urethra: Normal.   Vaginal Vault:  Discharge or Odor not detected. Blood not detected. Foreign body or Injury not detected. Uterus:  Appropriate size for gestational age. Musculoskeletal: Normal range of motion. Neurological: Loss of strength or sensation not detected. Disorientation to time, person, place not detected. Skin: Lesions not detected. Rashes not detected.   Extremities:  Trauma,  Swelling or edema not detected. Psychiatric: Abnormal mood or affect not detected.      Signed By:    Jadiel Garcia CNM  January 5, 2018 3:38 PM

## 2018-01-06 ENCOUNTER — ANESTHESIA (OUTPATIENT)
Dept: LABOR AND DELIVERY | Age: 33
End: 2018-01-06
Payer: COMMERCIAL

## 2018-01-06 ENCOUNTER — ANESTHESIA EVENT (OUTPATIENT)
Dept: LABOR AND DELIVERY | Age: 33
End: 2018-01-06
Payer: COMMERCIAL

## 2018-01-06 ENCOUNTER — HOSPITAL ENCOUNTER (INPATIENT)
Age: 33
LOS: 2 days | Discharge: HOME | End: 2018-01-08
Attending: OBSTETRICS & GYNECOLOGY | Admitting: SPECIALIST
Payer: COMMERCIAL

## 2018-01-06 LAB
ABO + RH BLD: NORMAL
BASOPHILS # BLD: 0 K/UL (ref 0–0.06)
BASOPHILS NFR BLD: 0 % (ref 0–2)
BLOOD GROUP ANTIBODIES SERPL: NORMAL
DIFFERENTIAL METHOD BLD: ABNORMAL
EOSINOPHIL # BLD: 0.1 K/UL (ref 0–0.4)
EOSINOPHIL NFR BLD: 1 % (ref 0–5)
ERYTHROCYTE [DISTWIDTH] IN BLOOD BY AUTOMATED COUNT: 12.8 % (ref 11.6–14.5)
HCT VFR BLD AUTO: 35.7 % (ref 35–45)
HGB BLD-MCNC: 12.2 G/DL (ref 12–16)
LYMPHOCYTES # BLD: 2.5 K/UL (ref 0.9–3.6)
LYMPHOCYTES NFR BLD: 23 % (ref 21–52)
MCH RBC QN AUTO: 29.8 PG (ref 24–34)
MCHC RBC AUTO-ENTMCNC: 34.2 G/DL (ref 31–37)
MCV RBC AUTO: 87.1 FL (ref 74–97)
MONOCYTES # BLD: 0.9 K/UL (ref 0.05–1.2)
MONOCYTES NFR BLD: 8 % (ref 3–10)
NEUTS SEG # BLD: 7.4 K/UL (ref 1.8–8)
NEUTS SEG NFR BLD: 68 % (ref 40–73)
PLATELET # BLD AUTO: 271 K/UL (ref 135–420)
PMV BLD AUTO: 11.2 FL (ref 9.2–11.8)
RBC # BLD AUTO: 4.1 M/UL (ref 4.2–5.3)
SPECIMEN EXP DATE BLD: NORMAL
WBC # BLD AUTO: 11 K/UL (ref 4.6–13.2)

## 2018-01-06 PROCEDURE — 75410000002 HC LABOR FEE PER 1 HR

## 2018-01-06 PROCEDURE — 74011250637 HC RX REV CODE- 250/637: Performed by: ADVANCED PRACTICE MIDWIFE

## 2018-01-06 PROCEDURE — 74011250636 HC RX REV CODE- 250/636: Performed by: NURSE ANESTHETIST, CERTIFIED REGISTERED

## 2018-01-06 PROCEDURE — 86901 BLOOD TYPING SEROLOGIC RH(D): CPT | Performed by: ADVANCED PRACTICE MIDWIFE

## 2018-01-06 PROCEDURE — 74011250636 HC RX REV CODE- 250/636: Performed by: ADVANCED PRACTICE MIDWIFE

## 2018-01-06 PROCEDURE — 74011000250 HC RX REV CODE- 250

## 2018-01-06 PROCEDURE — 74011250636 HC RX REV CODE- 250/636

## 2018-01-06 PROCEDURE — 77030007879 HC KT SPN EPDRL TELE -B: Performed by: NURSE ANESTHETIST, CERTIFIED REGISTERED

## 2018-01-06 PROCEDURE — 77030034849

## 2018-01-06 PROCEDURE — 75410000000 HC DELIVERY VAGINAL/SINGLE

## 2018-01-06 PROCEDURE — 65270000029 HC RM PRIVATE

## 2018-01-06 PROCEDURE — 77030020255 HC SOL INJ LR 1000ML BG

## 2018-01-06 PROCEDURE — 76060000078 HC EPIDURAL ANESTHESIA

## 2018-01-06 PROCEDURE — 85025 COMPLETE CBC W/AUTO DIFF WBC: CPT | Performed by: ADVANCED PRACTICE MIDWIFE

## 2018-01-06 PROCEDURE — 75410000003 HC RECOV DEL/VAG/CSECN EA 0.5 HR

## 2018-01-06 PROCEDURE — 10907ZC DRAINAGE OF AMNIOTIC FLUID, THERAPEUTIC FROM PRODUCTS OF CONCEPTION, VIA NATURAL OR ARTIFICIAL OPENING: ICD-10-PCS | Performed by: ADVANCED PRACTICE MIDWIFE

## 2018-01-06 RX ORDER — HYDROCORTISONE 25 MG/G
CREAM TOPICAL AS NEEDED
Status: DISCONTINUED | OUTPATIENT
Start: 2018-01-06 | End: 2018-01-08 | Stop reason: HOSPADM

## 2018-01-06 RX ORDER — LEVOTHYROXINE SODIUM 50 UG/1
50 TABLET ORAL
Status: DISCONTINUED | OUTPATIENT
Start: 2018-01-07 | End: 2018-01-08 | Stop reason: HOSPADM

## 2018-01-06 RX ORDER — PROMETHAZINE HYDROCHLORIDE 25 MG/ML
25 INJECTION, SOLUTION INTRAMUSCULAR; INTRAVENOUS
Status: DISCONTINUED | OUTPATIENT
Start: 2018-01-06 | End: 2018-01-08 | Stop reason: HOSPADM

## 2018-01-06 RX ORDER — METHYLERGONOVINE MALEATE 0.2 MG/ML
0.2 INJECTION INTRAVENOUS AS NEEDED
Status: CANCELLED | OUTPATIENT
Start: 2018-01-06

## 2018-01-06 RX ORDER — TERBUTALINE SULFATE 1 MG/ML
0.25 INJECTION SUBCUTANEOUS
Status: DISCONTINUED | OUTPATIENT
Start: 2018-01-06 | End: 2018-01-06

## 2018-01-06 RX ORDER — METHYLERGONOVINE MALEATE 0.2 MG/ML
0.2 INJECTION INTRAVENOUS AS NEEDED
Status: DISCONTINUED | OUTPATIENT
Start: 2018-01-06 | End: 2018-01-06

## 2018-01-06 RX ORDER — IBUPROFEN 400 MG/1
800 TABLET ORAL
Status: DISCONTINUED | OUTPATIENT
Start: 2018-01-06 | End: 2018-01-08 | Stop reason: HOSPADM

## 2018-01-06 RX ORDER — ACETAMINOPHEN 325 MG/1
650 TABLET ORAL
Status: DISCONTINUED | OUTPATIENT
Start: 2018-01-06 | End: 2018-01-08 | Stop reason: HOSPADM

## 2018-01-06 RX ORDER — NALBUPHINE HYDROCHLORIDE 10 MG/ML
10 INJECTION, SOLUTION INTRAMUSCULAR; INTRAVENOUS; SUBCUTANEOUS
Status: CANCELLED | OUTPATIENT
Start: 2018-01-06

## 2018-01-06 RX ORDER — LIDOCAINE HYDROCHLORIDE 10 MG/ML
20 INJECTION, SOLUTION EPIDURAL; INFILTRATION; INTRACAUDAL; PERINEURAL AS NEEDED
Status: CANCELLED | OUTPATIENT
Start: 2018-01-06

## 2018-01-06 RX ORDER — BUTORPHANOL TARTRATE 1 MG/ML
2 INJECTION INTRAMUSCULAR; INTRAVENOUS
Status: DISCONTINUED | OUTPATIENT
Start: 2018-01-06 | End: 2018-01-06

## 2018-01-06 RX ORDER — SALICYLIC ACID
30 POWDER (GRAM) MISCELLANEOUS ONCE
Status: CANCELLED | OUTPATIENT
Start: 2018-01-06 | End: 2018-01-06

## 2018-01-06 RX ORDER — FENTANYL CITRATE 50 UG/ML
100 INJECTION, SOLUTION INTRAMUSCULAR; INTRAVENOUS ONCE
Status: COMPLETED | OUTPATIENT
Start: 2018-01-06 | End: 2018-01-06

## 2018-01-06 RX ORDER — SALICYLIC ACID
30 POWDER (GRAM) MISCELLANEOUS ONCE
Status: COMPLETED | OUTPATIENT
Start: 2018-01-06 | End: 2018-01-06

## 2018-01-06 RX ORDER — SODIUM CHLORIDE, SODIUM LACTATE, POTASSIUM CHLORIDE, CALCIUM CHLORIDE 600; 310; 30; 20 MG/100ML; MG/100ML; MG/100ML; MG/100ML
125 INJECTION, SOLUTION INTRAVENOUS CONTINUOUS
Status: DISCONTINUED | OUTPATIENT
Start: 2018-01-06 | End: 2018-01-06

## 2018-01-06 RX ORDER — HYDROMORPHONE HYDROCHLORIDE 1 MG/ML
1 INJECTION, SOLUTION INTRAMUSCULAR; INTRAVENOUS; SUBCUTANEOUS
Status: CANCELLED | OUTPATIENT
Start: 2018-01-06

## 2018-01-06 RX ORDER — OXYTOCIN/RINGER'S LACTATE 20/1000 ML
125 PLASTIC BAG, INJECTION (ML) INTRAVENOUS CONTINUOUS
Status: DISCONTINUED | OUTPATIENT
Start: 2018-01-06 | End: 2018-01-06

## 2018-01-06 RX ORDER — MISOPROSTOL 200 UG/1
800 TABLET ORAL ONCE
Status: DISCONTINUED | OUTPATIENT
Start: 2018-01-06 | End: 2018-01-06

## 2018-01-06 RX ORDER — OXYTOCIN/RINGER'S LACTATE 20/1000 ML
500 PLASTIC BAG, INJECTION (ML) INTRAVENOUS ONCE
Status: CANCELLED | OUTPATIENT
Start: 2018-01-06 | End: 2018-01-06

## 2018-01-06 RX ORDER — MISOPROSTOL 200 UG/1
800 TABLET ORAL ONCE
Status: CANCELLED | OUTPATIENT
Start: 2018-01-06 | End: 2018-01-06

## 2018-01-06 RX ORDER — PHENYLEPHRINE HCL IN 0.9% NACL 0.4MG/10ML
80 SYRINGE (ML) INTRAVENOUS AS NEEDED
Status: DISCONTINUED | OUTPATIENT
Start: 2018-01-06 | End: 2018-01-06

## 2018-01-06 RX ORDER — LIDOCAINE HYDROCHLORIDE 10 MG/ML
20 INJECTION, SOLUTION EPIDURAL; INFILTRATION; INTRACAUDAL; PERINEURAL AS NEEDED
Status: DISCONTINUED | OUTPATIENT
Start: 2018-01-06 | End: 2018-01-06

## 2018-01-06 RX ORDER — AMOXICILLIN 250 MG
1 CAPSULE ORAL
Status: DISCONTINUED | OUTPATIENT
Start: 2018-01-06 | End: 2018-01-08 | Stop reason: HOSPADM

## 2018-01-06 RX ORDER — ROPIVACAINE HYDROCHLORIDE 2 MG/ML
INJECTION, SOLUTION EPIDURAL; INFILTRATION; PERINEURAL AS NEEDED
Status: DISCONTINUED | OUTPATIENT
Start: 2018-01-06 | End: 2018-01-06 | Stop reason: HOSPADM

## 2018-01-06 RX ORDER — LIDOCAINE HYDROCHLORIDE 20 MG/ML
INJECTION, SOLUTION EPIDURAL; INFILTRATION; INTRACAUDAL; PERINEURAL AS NEEDED
Status: DISCONTINUED | OUTPATIENT
Start: 2018-01-06 | End: 2018-01-06 | Stop reason: HOSPADM

## 2018-01-06 RX ORDER — LIDOCAINE HYDROCHLORIDE AND EPINEPHRINE 15; 5 MG/ML; UG/ML
INJECTION, SOLUTION EPIDURAL AS NEEDED
Status: DISCONTINUED | OUTPATIENT
Start: 2018-01-06 | End: 2018-01-06 | Stop reason: HOSPADM

## 2018-01-06 RX ORDER — HYDROMORPHONE HCL IN 0.9% NACL 50 MG/50ML
1 PLASTIC BAG, INJECTION (ML) INJECTION
Status: DISCONTINUED | OUTPATIENT
Start: 2018-01-06 | End: 2018-01-06

## 2018-01-06 RX ORDER — SODIUM CHLORIDE, SODIUM LACTATE, POTASSIUM CHLORIDE, CALCIUM CHLORIDE 600; 310; 30; 20 MG/100ML; MG/100ML; MG/100ML; MG/100ML
125 INJECTION, SOLUTION INTRAVENOUS CONTINUOUS
Status: CANCELLED | OUTPATIENT
Start: 2018-01-06

## 2018-01-06 RX ORDER — OXYTOCIN/RINGER'S LACTATE 20/1000 ML
125 PLASTIC BAG, INJECTION (ML) INTRAVENOUS CONTINUOUS
Status: CANCELLED | OUTPATIENT
Start: 2018-01-06

## 2018-01-06 RX ORDER — TERBUTALINE SULFATE 1 MG/ML
0.25 INJECTION SUBCUTANEOUS
Status: CANCELLED | OUTPATIENT
Start: 2018-01-06

## 2018-01-06 RX ORDER — NALBUPHINE HYDROCHLORIDE 10 MG/ML
10 INJECTION, SOLUTION INTRAMUSCULAR; INTRAVENOUS; SUBCUTANEOUS
Status: DISCONTINUED | OUTPATIENT
Start: 2018-01-06 | End: 2018-01-06

## 2018-01-06 RX ORDER — BUTORPHANOL TARTRATE 1 MG/ML
2 INJECTION INTRAMUSCULAR; INTRAVENOUS
Status: CANCELLED | OUTPATIENT
Start: 2018-01-06

## 2018-01-06 RX ORDER — OXYCODONE AND ACETAMINOPHEN 5; 325 MG/1; MG/1
2 TABLET ORAL
Status: DISCONTINUED | OUTPATIENT
Start: 2018-01-06 | End: 2018-01-08 | Stop reason: HOSPADM

## 2018-01-06 RX ORDER — OXYTOCIN/RINGER'S LACTATE 20/1000 ML
500 PLASTIC BAG, INJECTION (ML) INTRAVENOUS ONCE
Status: COMPLETED | OUTPATIENT
Start: 2018-01-06 | End: 2018-01-06

## 2018-01-06 RX ORDER — ZOLPIDEM TARTRATE 5 MG/1
5 TABLET ORAL
Status: DISCONTINUED | OUTPATIENT
Start: 2018-01-06 | End: 2018-01-08 | Stop reason: HOSPADM

## 2018-01-06 RX ORDER — EPHEDRINE SULFATE 50 MG/ML
5 INJECTION, SOLUTION INTRAVENOUS AS NEEDED
Status: DISCONTINUED | OUTPATIENT
Start: 2018-01-06 | End: 2018-01-06

## 2018-01-06 RX ORDER — FENTANYL CITRATE 50 UG/ML
INJECTION, SOLUTION INTRAMUSCULAR; INTRAVENOUS
Status: COMPLETED
Start: 2018-01-06 | End: 2018-01-06

## 2018-01-06 RX ADMIN — Medication 10000 MILLI-UNITS/HR: at 09:47

## 2018-01-06 RX ADMIN — LIDOCAINE HYDROCHLORIDE AND EPINEPHRINE 3 ML: 15; 5 INJECTION, SOLUTION EPIDURAL at 05:20

## 2018-01-06 RX ADMIN — SODIUM CHLORIDE, SODIUM LACTATE, POTASSIUM CHLORIDE, AND CALCIUM CHLORIDE 1000 ML: 600; 310; 30; 20 INJECTION, SOLUTION INTRAVENOUS at 06:30

## 2018-01-06 RX ADMIN — IBUPROFEN 800 MG: 400 TABLET, FILM COATED ORAL at 16:39

## 2018-01-06 RX ADMIN — SODIUM CHLORIDE, SODIUM LACTATE, POTASSIUM CHLORIDE, AND CALCIUM CHLORIDE 500 ML: 600; 310; 30; 20 INJECTION, SOLUTION INTRAVENOUS at 04:51

## 2018-01-06 RX ADMIN — CASTOR OIL 30 ML: 1 LIQUID ORAL at 09:43

## 2018-01-06 RX ADMIN — ROPIVACAINE HYDROCHLORIDE 8 ML: 2 INJECTION, SOLUTION EPIDURAL; INFILTRATION; PERINEURAL at 08:07

## 2018-01-06 RX ADMIN — Medication 10 ML/HR: at 05:28

## 2018-01-06 RX ADMIN — FENTANYL CITRATE 100 MCG: 50 INJECTION, SOLUTION INTRAMUSCULAR; INTRAVENOUS at 05:24

## 2018-01-06 RX ADMIN — ROPIVACAINE HYDROCHLORIDE 5 ML: 2 INJECTION, SOLUTION EPIDURAL; INFILTRATION; PERINEURAL at 07:14

## 2018-01-06 RX ADMIN — LIDOCAINE HYDROCHLORIDE 5 ML: 20 INJECTION, SOLUTION EPIDURAL; INFILTRATION; INTRACAUDAL; PERINEURAL at 06:20

## 2018-01-06 RX ADMIN — ROPIVACAINE HYDROCHLORIDE 5 ML: 2 INJECTION, SOLUTION EPIDURAL; INFILTRATION; PERINEURAL at 05:27

## 2018-01-06 NOTE — IP AVS SNAPSHOT
[address] Patient: Asiya Paige MRN: [id number] :1985 About your hospitalization You were admitted on:  2018 You last received care in the:  [place] [address] You were discharged on:  2018 Why you were hospitalized Your primary diagnosis was:  Postpartum Examination Following Vaginal Delivery Your diagnoses also included:  Labor And Delivery Indication For Care Or Intervention Follow-up Information Follow up With Details Comments Contact Info None   None ([phone]) Patient stated that they have no PCP Ish Sharp CNM Schedule an appointment as soon as possible for a visit in 6 weeks postpartun follow-up [address] [facility], [address] [place] [id number] [phone] Discharge Orders None A check marlyn indicates which time of day the medication should be taken. My Medications START taking these medications Instructions Each Dose to Equal  
 Morning Noon Evening Bedtime  
 ibuprofen 800 mg tablet Commonly known as:  MOTRIN Your last dose was: Your next dose is: Take 1 Tab by mouth every eight (8) hours as needed. 800 mg CONTINUE taking these medications Instructions Each Dose to Equal  
 Morning Noon Evening Bedtime  
 levothyroxine 50 mcg tablet Commonly known as:  SYNTHROID Your last dose was: Your next dose is: Take  by mouth Daily (before breakfast). PNV38-Iron Cbn&Gluc-FA-DSS-DHA 35-1- mg Cmpk Your last dose was: Your next dose is: Take  by mouth. Where to Get Your Medications Information on where to get these meds will be given to you by the nurse or doctor. ! Ask your nurse or doctor about these medications ibuprofen 800 mg tablet Discharge Instructions CONGRATULATIONS ON THE BIRTH OF YOUR BABY! The first six weeks after childbirth is a time of physical and emotional adjustment. This handout will help to answer questions and provide guidance during the postpartum period. Every family's adjustment is unique, so please call if you have further concerns. At anytime we can be reached at [phone]. During office hours please ask to speak to a charge nurse. After hours, the answering service will take a message and the Nurse-Midwife on-call will return your call. If your question can wait until office hours: Monday-Friday 8:30-4:00, please do so. For emergencies or urgent concerns do not hesitate to call us after hours. DIET Your body is in need of a well-balanced, high protein diet to recuperate from birth. Please continue to take your prenatal vitamins for 6 weeks or as long as you are breastfeeding. Continue to drink at least 6-8 cups of water or other liquid a day. A breastfeeding mother also needs extra protein, calories and calcium containing foods. It is a good rule to drink fluids with every feeding in order to maintain an adequate milk supply and avoid dehydration. Your baby will probably not be bothered by things in your diet, but if the baby seems extremely fussy or develops a rash, you may want to discuss possible food intolerances with your baby's care provider. PAIN MEDICATIONS Acetaminophen (Tylenol), ibuprofen (Motrin), or other prescribed pain medication may be taken as directed to relieve discomfort. The above medications pass in very minimal amounts into the breast milk and usually will not cause problems. There are medications that may affect the baby, so please consult your baby's care provider before taking medication.   If you are breastfeeding, be sure to mention this to any care provider you see so that medications that are safe may be selected. There is an excellent resource called [employer] that is a resource for medication safety in pregnancy and lactation. You can visit their website at [employer]/ or call them toll free at [phone] if you have any questions about medication safety. UTERINE INVOLUTION / VAGINAL BLEEDING Involution is the process of the uterus returning to pre-pregnant size. It will take approximately six weeks for this process to occur. To achieve this size your uterus becomes firm to slow bleeding loss from the placental site. The first 7 days after birth, the bleeding is red and heavy. It may change with your activity and position. Some small clots are normal.   After ten days, the bleeding should be pale pink and slowed considerably. The next several weeks may progress to a pink, mucousy discharge. This may continue for 6-8 weeks, depending on your activity. During the first four weeks after delivery we recommend using sanitary pads instead of tampons. Douching should also be avoided, but it is fine to take a tub bath so long as the tub is very clean. ACTIVITY/EXERCISE Adequate rest is essential to recovery. Try to rest or sleep when the baby sleeps. After two weeks, you may begin going for short walks, doing Kegel exercises and abdominal crunches. Avoid heavy, jarring or aerobic exercises. Remember to start out slowly and build up to your previous fitness level. Use common sense and don't overdo as rest is important and the benefits of increased rest are a quicker recovery. For the first two weeks after a  try to limit trips up or down steps. Do not lift anything heavier than the baby during this time.   Lifting the baby or other objects should be done by bending at the knees rather than the waist.  Driving should be avoided during the first two to three weeks until you have the strength to push firmly on the brakes in case of an emergency. You may ride as a passenger, but DO wear a seat belt at all times. After a few weeks, you may resume normal activity at whatever pace is comfortable for you. Exercise may also be resumed gradually. Walking is a good way to start. Finally, try to be reasonable in your expectations. Caring for a new baby after major surgery can be quite trying. Arrange for assistance at home to ensure that you get enough rest.  
 
POSTPARTUM CHECK You may call the office when you return home to set up a postpartum visit. Most patients will be seen at 6 weeks after delivery, but after a  or other circumstances you may be seen in 2 weeks or less. If you are discharged from the hospital with staples that must be removed, you will be asked to come in sooner. At your postpartum visit, a pelvic exam may be performed. If you are having any problems or concerns, please do not hesitate to call. Once again our number is [phone]. MOOD CHANGES Significant hormonal changes occur in the days following delivery, and as a result, many women experience brief episodes of tearfulness or feeling \"blue. \"  These emotional swings may be made worse by lack of sleep and by the adjustments inherent in becoming a mother. For some women, these fluctuations are minor. For others, they are overwhelming; creating feelings of anxiety, depression, or the inability to cope. If you have difficulty functioning as a result of feeling down, or if the mood changes seem severe, do not improve, or result is thoughts of harming yourself or others CALL RIGHT AWAY. PERINEAL CARE The basic goals of perineal care are to prevent infection, to relieve pain and promote healing. Your stitches will dissolve in four to six weeks, and do not need to be removed. After urinating, please continue to clean with warm water from front to back.   Please continue sitz baths as instructed twice a day for a week or as needed. Call the office if you see pus in the suture site, or have unusual or severe swelling or pain that seems to be getting worse. RETURN OF MENSTRUATION Your first menstrual period may occur as soon as four to six weeks after your delivery if you are not breast-feeding. If breast-feeding it is more difficult to predict when your first period will occur. Even if you are not yet menstruating, you may be ovulating and it may be possible to conceive again. It is common for your first period after childbirth to be very heavy with an increased amount of cramping. BREASTS Breast-feeding Mothers: Colostrum is excreted in the first 24-72 hours. Mature breast milk will appear on the 2nd to 5th day. Engorgement may occur with the mature milk making your breasts feel warm and very full. Frequent feedings will make you more comfortable. Babies do not nurse on regular schedules. Nursing every 1 1/2 to 2 hours is normal and frequent feeding DOES NOT mean you are not making enough milk. To avoid nipple confusion, do not give bottles for the first 4 weeks. Growth spurts are common and may require more frequent feedings. This is the way baby increases your milk supply. During a growth spurt, you may feel you are feeding very frequently and that your breasts are \"empty. \"  Don't worry, your milk is produced by supply and demand so this increased frequency of feeding will increase your milk supply within 48 hours. Sore nipples may occur with frequent feedings and are sometimes also caused by improper latch. Check for a proper latch. Baby should have a wide open mouth. Use different positions at each feeding if possible. Express a small amount of colostrum or breast milk onto the sore area and leave bra flaps unlatched until dry. The lactation consultant at [facility] is available for outpatient consultation without charge.   Call [phone] from Monday-Friday 9:00am- 3:00pm to arrange an outpatient appointment with her. Local [facility] and consultants may also be very helpful. If You Are Not Breast-feeding: You will experience swelling, engorgement and some milk production. There are no safe medications available to stop lactation. Some remedies for engorgement include: wearing a tight bra, ice packs and cold green cabbage leaves placed between the breast and your bra. Change these frequently. Tylenol or Motrin should help with the discomfort. SEXUAL ADJUSTMENTS We recommend that you wait at least four weeks before resuming sexual intercourse. A sore perineum, a demanding baby and fatigue will certainly affect your ability to enjoy lovemaking! A vaginal lubricant is recommended to help with any dryness. It is very important to remember that you will ovulate BEFORE your first period and can conceive. If you do not wish another pregnancy right away, please take precautions to avoid pregnancy. If you would like a prescription method of birth control, please discuss this with us at your 6 week visit. ELIMINATION We remind all postpartum patients that it may take a few days for your bowels to return to normal, especially if you had a long labor. For those who had C-sections or severe lacerations, we recommend that you use a stool softener twice daily for at least two weeks. Many stool softeners are over-the-counter. Colace (Docusate Sodium) is recommended. Bulk forming agents such as Metamucil or Fibercon may be used daily in addition to a stool softener to promote regular bowel movements. Eating fresh fruits and vegetables along with whole grains is helpful as well. Do not be afraid to have a bowel movement as your stitches will not \"come out\" in the course of having a bowel movement.   Urination may be difficult due to soreness around the urethra, or as an after effect of epidural.  This is temporary and can be helped  by squirting water over the perineum or try going in the shower. Hemorrhoids are common after birth. Tucks pads, Anusol cream and avoiding constipation are helpful. If constipation does occur, you may take Milk of Magnesia or Senekot according to the package instructions. DANGER SIGNS! CALL WITHOUT DELAY IF YOU ARE EXPERIENCING ANY OF THE FOLLOWING: 
* Unusually heavy bleeding, soaking more than 1 or more pads in an hour. * Vaginal discharge with strong foul odor. * Fever of 101 or higher * Unusual pain or tenderness in the abdominal area. * If breasts are red, hot or have a painful lump. * Depression that persists longer than 1-2 weeks or is severe. * Any urinary frequency accompanied by urgency or pain. * A lump in leg or calf especially if painful, warm or red. We thank you for choosing us for your prenatal care and/or delivery. We wish you all happiness and health with your baby for his or her lifetime! Eligio Padron MD  
 
 
Patient armband removed and shredded [email or web address] Announcement We are excited to announce that we are making your provider's discharge notes available to you in [email or web address]. You will see these notes when they are completed and signed by the physician that discharged you from your recent hospital stay. If you have any questions or concerns about any information you see in [email or web address], please call the Health Information Department where you were seen or reach out to your Primary Care Provider for more information about your plan of care. Introducing [facility] & HEALTH SERVICES! [facility] introduces [email or web address] patient portal. Now you can access parts of your medical record, email your doctor's office, and request medication refills online. 1. In your internet browser, go to [email or web address] 2. Click on the First Time User? Click Here link in the Sign In box. You will see the New Member Sign Up page. 3. Enter your TrustEgg Access Code exactly as it appears below. You will not need to use this code after youve completed the sign-up process. If you do not sign up before the expiration date, you must request a new code. · TrustEgg Access Code: [email or web address] Expires: 3/7/2018  9:55 AM 
 
4. Enter the last four digits of your Social Security Number (xxxx) and Date of Birth (mm/dd/yyyy) as indicated and click Submit. You will be taken to the next sign-up page. 5. Create a TrustEgg ID. This will be your TrustEgg login ID and cannot be changed, so think of one that is secure and easy to remember. 6. Create a TrustEgg password. You can change your password at any time. 7. Enter your Password Reset Question and Answer. This can be used at a later time if you forget your password. 8. Enter your e-mail address. You will receive e-mail notification when new information is available in [address]. 9. Click Sign Up. You can now view and download portions of your medical record. 10. Click the Download Summary menu link to download a portable copy of your medical information. If you have questions, please visit the Frequently Asked Questions section of the TrustEgg website. Remember, TrustEgg is NOT to be used for urgent needs. For medical emergencies, dial 911. Now available from your iPhone and Android! Providers Seen During Your Hospitalization Provider Specialty Primary office phone Kriss Gee MD Obstetrics & Gynecology [phone] Your Primary Care Physician (PCP) Primary Care Physician Office Phone Office Fax NONE ** None ** ** None ** You are allergic to the following No active allergies Recent Documentation Height Weight Breastfeeding? BMI OB Status Smoking Status 1.549 m 61.2 kg Unknown 25.51 kg/m2 Recent pregnancy Never Smoker Emergency Contacts Name Discharge Info Relation Home Work Mobile NeftalyGabe sheppard DISCHARGE CAREGIVER [3] Spouse [3]   [phone] Patient Belongings The following personal items are in your possession at time of discharge: 
  Dental Appliances: None  Visual Aid: Contacts      Home Medications: Kept at bedside   Jewelry: Elfida Easter, With patient  Clothing: At bedside    Other Valuables: At bedside Discharge Instructions Attachments/References DEPRESSION: POSTPARTUM (ENGLISH) Patient Handouts Depression After Childbirth: Care Instructions Your Care Instructions Many women get the \"baby blues\" during the first few days after childbirth. You may lose sleep, feel irritable, and cry easily. You may feel happy one minute and sad the next. Hormone changes are one cause of these emotional changes. Also, the demands of a new baby, along with visits from relatives or other family needs, add to a mother's stress. The \"baby blues\" often peak around the fourth day. Then they ease up in less than 2 weeks. If your moodiness or anxiety lasts for more than 2 weeks, or if you feel like life is not worth living, you may have postpartum depression. This is different for each mother. Some mothers with serious depression may worry intensely about their infant's well-being. Others may feel distant from their child. Some mothers might even feel that they might harm their baby. A mother may have signs of paranoia, wondering if someone is watching her. Depression is not a sign of weakness. It is a medical condition that requires treatment. Medicine and counseling often work well to reduce depression. Talk to your doctor about taking antidepressant medicine while breastfeeding. Follow-up care is a key part of your treatment and safety. Be sure to make and go to all appointments, and call your doctor if you are having problems. It's also a good idea to know your test results and keep a list of the medicines you take. How do you know if you are depressed? With all the changes in your life, you may not know if you are depressed. Pregnancy sometimes causes changes in how you feel that are similar to the symptoms of depression. Symptoms of depression include: · Feeling sad or hopeless and losing interest in daily activities. These are the most common symptoms of depression. · Sleeping too much or not enough. · Feeling tired. You may feel as if you have no energy. · Eating too much or too little. · Writing or talking about death, such as writing suicide notes or talking about guns, knives, or pills. Keep the numbers for these national suicide hotlines: 1-209-835-TALK (1-227.810.1560) and 2-256-MLKDQLI (1-378.328.3769). If you or someone you know talks about suicide or feeling hopeless, get help right away. How can you care for yourself at home? · Be safe with medicines. Take your medicines exactly as prescribed. Call your doctor if you think you are having a problem with your medicine. · Eat a healthy diet so that you can keep up your energy. · Get regular daily exercise, such as walks, to help improve your mood. · Get as much sunlight as possible. Keep your shades and curtains open. Get outside as much as you can. · Avoid using alcohol or other substances to feel better. · Get as much rest and sleep as possible. Avoid doing too much. Being too tired can increase depression. · Play stimulating music throughout your day and soothing music at night. · Schedule outings and visits with friends and family. Ask them to call you regularly, so that you do not feel alone. · Ask for help with preparing food and other daily tasks. Family and friends are often happy to help a mother with a . · Be honest with yourself and those who care about you. Tell them about your struggle. · Join a support group of new mothers. No one can better understand the challenges of caring for a  than other new mothers. · If you feel like life is not worth living or are feeling hopeless, get help right away. Keep the numbers for these national suicide hotlines: 1-431-664-TALK (7-659.150.5067) and 4-147-PDSKRKK (2-258.555.9114). When should you call for help? Call 911 anytime you think you may need emergency care. For example, call if: 
? · You feel you cannot stop from hurting yourself, your baby, or someone else. ?Call your doctor now or seek immediate medical care if: 
? · You are having trouble caring for yourself or your baby. ? · You hear voices. ? Watch closely for changes in your health, and be sure to contact your doctor if: 
? · You have problems with your depression medicine. ? · You do not get better as expected. Where can you learn more? Go to http://gene-layton.info/. Enter J678 in the search box to learn more about \"Depression After Childbirth: Care Instructions. \" Current as of: May 12, 2017 Content Version: 11.4 © 3433-0823 [employer]. Care instructions adapted under license by [employer]) (which disclaims liability or warranty for this information). If you have questions about a medical condition or this instruction, always ask your healthcare professional. [address] any warranty or liability for your use of this information. Please provide this summary of care documentation to your next provider. Signatures-by signing, you are acknowledging that this After Visit Summary has been reviewed with you and you have received a copy. Patient Signature:  ____________________________________________________________ Date:  ____________________________________________________________  
  
Marlyse Leaks Provider Signature:  ____________________________________________________________ Date:  ____________________________________________________________
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
## 2018-01-06 NOTE — L&D DELIVERY NOTE
Delivery Summary    Patient: Israel Herrera MRN: [id number]  SSN: [id number]    YOB: 1985  Age: 34 y.o. Sex: female         of a live female infant using epidural anesthesia. Infant delivered LOLIS position, loose cord around trunk, reduces after delivery, and clear fluid. Body delivered without difficulty.  to mothers chest, after cord pulse cessation, cord was doubly clamped and cut. Placenta delivered spontaneously, intact in Whiteside rhina position. Fundus firm, with minimal bleeding. Placenta inspected and intact with 3 vessel cord with marginal insertion. Perineum and vagina inspected and there was found to be small introitus skin lacerations with good hemostasis. Infant with Apgars 9/9. EBL 250cc. Pt tolerated procedure well, recovering in LDR. Mother and baby stable. Dr Khan Gathers aware of delivery. Labor Events:    Labor: No    Rupture Date: 2018    Rupture Time: 9:39 AM    Rupture Type AROM    Amniotic Fluid Volume:      Amniotic Fluid Description: Clear  None    Induction: None        Augmentation: AROM    Labor Complications: Fetal Intolerance     Additional Complications:        Cervical Ripening:       None      Delivery Events:  Episiotomy: None    Laceration(s):  Other (comment)      Repaired: None     Number of Repair Packets:      Suture Type and Size:         Estimated Blood Loss (ml):          Information for the patient's :  Angel Frances [[id number]]     Delivery Summary - Baby    Delivery Date: 2018   Delivery Time: 9:46 AM   Delivery Type: Vaginal, Spontaneous Delivery  Sex:  female  Gestational Age: 38w9d  Delivery Clinician:  Guilherme Fulton  Living?: Living   Delivery Location: L&D [facility]           APGARS  One minute Five minutes Ten minutes   Skin Color: 1    1       Heart Rate: 2   2         Reflex Irritability: 2   2         Muscle Tone: 2   2       Respiration: 2   2         Total: 9   9           Presentation: Vertex  Position:   Occiput Anterior  Resuscitation Method:  Suctioning-bulb; Tactile Stimulation     Meconium Stained: None    Cord Information: 3 Vessels   Complications: Nuchal Cord Without Compressions  Cord Blood Sent?:  Yes    Blood Gases Sent?:  No    Placenta:  Date/Time:   9:49 AM  Removal: Spontaneous      Appearance: Normal      Measurements:  Birth Weight: pending    Other Providers:   KAMRON DUMONT;GERRY SKINNER;Jamir SHARP JEAN Midwife;Primary Nurse;Primary [place] Nurse;Crna           Cord Blood Results:  Information for the patient's :  Donijesse Berger [[id number]]   No results found for: ABORH, PCTABR, PCTDIG, BILI, ABORHEXT, ABORH    Information for the patient's :  Doni Ab [[id number]]   No results found for: APH, APCO2, APO2, AHCO3, ABEC, ABDC, O2ST, SITE, [place], PHI, [place], PO2I, HCO3I, SO2I, IBD     Information for the patient's :  Doni Ab [[id number]]   No results found for: EPHV, PCO2V, PO2V, HCO3V, O2STV, EBDV

## 2018-01-06 NOTE — PROGRESS NOTES
0710Yunior Yeager CRNA in room finishing epidural placement. 0740: Patient states having back pain radiating to left upper leg during contractions. Paged anesthesia to come assess. Will be up ASAP.    0802: SHAUNA Carreon call to check status on patient. RN to perform cervical exam.    0804: Dr. Stacy Espinoza in room for epidural assessment    0809: Dr. Chanel Martínez epidural while patient on left side per patient request.    3983: Cervical exam: /-1, can still feel taught membranes before fetal head. 8739: Updated SHAUNA Carreon on cervical exam.    5617: REBECCA Swartz in room, patient is complete: 10/100/+3.    3872: Begin pushing with CNM.    0340:  of VFI with spontaneous respirations. Baby to maternal abdomen. Baby being tended to by nursery nurse. 8843: Cord clamped and cut.    0949: Spontaneous delivery of placenta. 6242: Perineum inspected by CNM. Skid marlyn noted. No repair. 7648: Leslie-care done, Ice pack applied, Baby remains skin to skin for magic hour, side rails up and call light in reach, FOB at bedside. Patient instructed not to get for the first time without nurse at bedside and to call with increased bleeding. 1000: Called dietary for breakfast tray. 1020: Breakfast brought to room. 1145: Patient states right leg is still numb, will let patient eat lunch and monitor numbness.

## 2018-01-06 NOTE — PROGRESS NOTES
8522 - Patient arrived to unit with complaints of contractions every 2-3 mins with a 7/10 pain. Denies bleeding, leaking of fluid and decreased fetal movement. VS stable. FHR reassuring. SVE 4/80/-1. Will updated CNM on patient's presentation. 36 - CNM at bedside to discuss patient's progression and laboring choices. 0400 - Patient admitted for labor. Requests an epidural.     [id number] - Anesthesia provider called to request epidural. Patient has received bolus and labs are within normal range. 1650 - Patient sitting up for epidural.  FOB sitting in kelsy for support. Patient very uncomfortable during contractions. 8936 - TAYLER Grewal at bedside. 2472 - Pre procedure time out completed. 7421 - Epidural placed. No complications noted. 6619 - Test dose given. BP auto cycle Q. 2mins. Vital signs remain stable. 0964 - Maintenance dose started. Patient positioned with HOB elevated and right tilted. Vital signs remain stable. 7833 - Patient states feeling very little relief although less than before epidural.  Dermatone test indicates very little numbness. VS stable. Will update CRNA of lack of pain relief.     0600 - CNM at bedside to assess for labor progression and rupture membranes. SVE 5/90/-1 AROM bloody/scant fluid. 5623 -  Lawrence placed. Patient tolerated well. Patient complains of pain 6/10. Will continue to monitor. 4366 - MARCIE Grewal CRNA at bedside to assess patient. 7573 - Patient requests epidural replacement. 0700 - MARCIE Grewal at bedside to replace epidural.    0703 - Time out completed.     3929: Test dose

## 2018-01-06 NOTE — PROGRESS NOTES
Labor Progress Note  Patient seen, fetal heart rate and contraction pattern evaluated, patient examined. Patient Vitals for the past 1 hrs:   BP Pulse SpO2   01/06/18 0548 117/81 65 -   01/06/18 0545 116/77 68 100 %   01/06/18 0542 129/85 73 -   01/06/18 0539 122/80 67 -   01/06/18 0536 125/87 66 -   01/06/18 0535 - - 100 %   01/06/18 0533 123/83 68 -   01/06/18 0530 123/81 67 100 %   01/06/18 0527 121/75 61 -   01/06/18 0525 110/72 70 100 %   01/06/18 0523 120/76 73 -   01/06/18 0520 106/78 75 100 %     Lab Results  Component Value Date/Time   WBC 11.0 01/06/2018 04:30 AM   HGB 12.2 01/06/2018 04:30 AM   HCT 35.7 01/06/2018 04:30 AM   PLATELET 698 83/27/9975 04:30 AM   MCV 87.1 01/06/2018 04:30 AM       Physical Exam:  Cervical Exam:  5 cm dilated    90% effaced    -1 station    Membranes:  Artificial Rupture of Membranes; Amniotic Fluid: small amount of bloody fluid  Uterine Activity: Frequency: Every 5-6 minutes  Fetal Heart Rate: Baseline: 130 per minute  Variability: moderate  Accelerations: yes    Assessment/Plan:  Reassuring fetal status, Labor  Continue expectant management, Continue plan for vaginal delivery   AROM for small amount of bloody fluid  Has epidural but still some pain with contractions, but better she states.

## 2018-01-06 NOTE — PROGRESS NOTES
~~1300 REC TO [facility]  NOW AFTER  OF VFI OVER INTACT PERINEUM UNDER (POORLY FUNCTIONING) EPID ANES. PT HAS VOIDED X1. PT HAS NOT NEEDED ANY PAIN MED SINCE DELIVERY. PT SETTLED IN BED, SR UP X2, CB IN REACH, FOB IN HOLDING BABY. PT ORIENTED TO ROOM & WHITE BOARD. QUIET TIME REVIEWED. PT GIVEN WATER MUG, REVIEWED WATER CONSUMPTION. ASSESSMENT AS CHARTED BY GEOVANNA UMAÑA. PT INSTRUCTED TO CALL BEFORE GETTING OOB-- UNDERSTANDS. REVIEWED EMERGENCY CORD IN BR & WHEN TO USE- PT VOICES UNDERSTANDING. PT GIVEN INSTRUCTIONS ON USING TUCKS, CHEMICAL ICE PACKS & DERMAPLAST SPRAY-- PROVIDED. REVIEWED W/ PT TO CALL WHEN PAIN MED NEEDED, IF BLEEDING INCREASES, ANY NAUSEA OR ITCHING OR W/ ANY OTHER CONCERNS.

## 2018-01-06 NOTE — ROUTINE PROCESS
Bedside and Verbal shift change report given to OLE Mcknight (oncoming nurse) by ANTHONY Head (offgoing nurse). Report included the following information SBAR, Intake/Output, MAR and Recent Results.

## 2018-01-06 NOTE — H&P
Interval H&P    Patient seen, fetal heart rate and contraction pattern evaluated, patient examined. Patient Vitals for the past 1 hrs:   BP Pulse Height Weight   01/06/18 0327 122/80 71 - -   01/06/18 0326 - - 5' 1\" (1.549 m) 61.2 kg (135 lb)       Physical Exam:  Cervical Exam:  4 cm dilated    Membranes:  Intact  Uterine Activity: Frequency: Every 5 minutes  Fetal Heart Rate: Baseline: 130 per minute    Assessment/Plan:  Reassuring fetal status, Continue plan for vaginal delivery   Gianna Rae arrived at 4 cms, contractions spaced when lying down. Options for ambulation, enema offered.  Desires epidural. Last labor moved quickly and unable to get epidural. Admit and prepare for epidural. marcelino

## 2018-01-06 NOTE — ANESTHESIA PREPROCEDURE EVALUATION
Anesthetic History   No history of anesthetic complications            Review of Systems / Medical History  Patient summary reviewed, nursing notes reviewed and pertinent labs reviewed    Pulmonary  Within defined limits                 Neuro/Psych   Within defined limits           Cardiovascular  Within defined limits                     GI/Hepatic/Renal  Within defined limits              Endo/Other  Within defined limits           Other Findings   Comments: scoliosis           Physical Exam    Airway  Mallampati: II  TM Distance: 4 - 6 cm  Neck ROM: normal range of motion   Mouth opening: Normal     Cardiovascular  Regular rate and rhythm,  S1 and S2 normal,  no murmur, click, rub, or gallop             Dental  No notable dental hx       Pulmonary  Breath sounds clear to auscultation               Abdominal  GI exam deferred       Other Findings            Anesthetic Plan    ASA: 2  Anesthesia type: epidural            Anesthetic plan and risks discussed with: Patient

## 2018-01-06 NOTE — ANESTHESIA PROCEDURE NOTES
Epidural Block    Start time: 1/6/2018 5:04 AM  End time: 1/6/2018 5:29 AM  Performed by: Hakeem Garcia  Authorized by: Hakeem Garcia     Pre-Procedure  Indication: labor epidural    Preanesthetic Checklist: patient identified, risks and benefits discussed, anesthesia consent, patient being monitored, timeout performed and anesthesia consent    Timeout Time: 05:09        Epidural:   Patient position:  Seated  Prep region:  Lumbar  Prep: Betadine and Patient draped    Prep comment:  X3  Location:  L3-4    Needle and Epidural Catheter:   Needle Type:  Tuohy  Needle Gauge:  18 G  Injection Technique:  Loss of resistance using saline  Attempts:  1  Catheter Size:  20 G  Catheter at Skin Depth (cm):  14  Depth in Epidural Space (cm):  7  Events: no blood with aspiration, no cerebrospinal fluid with aspiration, no paresthesia and negative aspiration test    Test Dose:  Lidocaine 1.5% w/ epi and negative (3 cc's @ 0520)    Assessment:   Catheter Secured:  Tegaderm and tape  Insertion:  Uncomplicated  Patient tolerance:  Patient tolerated the procedure well with no immediate complications  Fentanyl 50 + 50 mcg @ 0524 via NADINE

## 2018-01-06 NOTE — LACTATION NOTE
Mom states baby is nursing well but, her nipples are sore. Mom had problems with first baby with tongue-tie and she got an infection in her nipples and mastitis. Mom ended up pumping for one year. Helped position baby in football hold on left side. Mom's nipple is damaged but latch feels better in football hold. Discussed asymmetric latch, positioning. Baby nursed well in football hold and mom states it \"feels better\". Discussed tongue-tie, do not think this baby has one. Discussed milk coming in. Outpatient lactation resource list given, encouraged to see LC if soreness continues. Info sheet and daily log given. Encouraged to ask for help as needed.

## 2018-01-06 NOTE — ROUTINE PROCESS
TRANSFER - OUT REPORT:    Verbal report given to ANTHONY Ogden, [address] Chela Humphrey RN on Hayley Sol  being transferred to Postpartum for routine progression of care       Report consisted of patients Situation, Background, Assessment and   Recommendations(SBAR). Information from the following report(s) SBAR, Procedure Summary, Intake/Output, MAR and Recent Results was reviewed with the receiving nurse. Lines:   Peripheral IV 01/06/18 Right Wrist (Active)   Site Assessment Clean, dry, & intact 1/6/2018  7:25 AM   Phlebitis Assessment 0 1/6/2018  7:25 AM   Infiltration Assessment 0 1/6/2018  7:25 AM   Dressing Status Clean, dry, & intact 1/6/2018  7:25 AM   Dressing Type Transparent 1/6/2018  7:25 AM   Hub Color/Line Status Pink 1/6/2018  7:25 AM        Opportunity for questions and clarification was provided.       Patient transported with:   Registered Nurse

## 2018-01-06 NOTE — PROGRESS NOTES
Pt up to restroom for 2nd void. Ambulated well and voided without difficulty. Instructed pt on cony care and how to use supplies. Instructed to call for assistance with next void.

## 2018-01-06 NOTE — PROGRESS NOTES
Pt up to bathroom for 3rd void, no assistance needed, no complaints of leg numbness or weakness, or dizziness. Informed patient that she did not need to call out anymore when she needed to go to the bathroom. Patient verbalized understanding.

## 2018-01-06 NOTE — PROGRESS NOTES
Labor Progress Note  Patient seen, fetal heart rate and contraction pattern evaluated, patient examined. Patient Vitals for the past 1 hrs:   BP Temp Pulse Resp SpO2   18 0845 - 98.3 °F (36.8 °C) - 18 -   18 0830 115/77 - 65 - -   18 0829 - - - - 99 %   18 0824 - - - - 100 %   18 0819 - - - - 100 %   18 0815 110/76 - 76 - -   18 0814 - - - - 100 %   18 0809 - - - - 96 %   18 0807 120/86 - 75 - -   18 0805 - - - - 99 %   18 0800 110/78 - 98 - 100 %   18 0755 - - - - 99 %       Physical Exam:  Cervical Exam:  6/100 %/-1/Posterior  Membranes:  Artificial Rupture of Membranes; Amniotic Fluid: medium amount of clear fluid  Uterine Activity: Frequency: Every 2-3.5 minutes and Duration: 60-80 seconds  Fetal Heart Rate: Baseline: 140 per minute  Variability: moderate  Accelerations: yes  Decelerations: none    Assessment: IUP 40w6d; active labor, ruptured forebag with moderate amount of clear fluid. Plan: Continue expectant management, anticipate . Position in high fowlers to help head descend on cervix.    Veronica Almendarez CNM

## 2018-01-06 NOTE — ANESTHESIA PROCEDURE NOTES
Epidural Block    Start time: 1/6/2018 7:00 AM  End time: 1/6/2018 7:15 AM  Performed by: Prisicla Lancaster  Authorized by: Priscila Lancaster     Pre-Procedure  Indication: labor epidural    Preanesthetic Checklist: patient identified, risks and benefits discussed, anesthesia consent, patient being monitored, timeout performed and anesthesia consent    Timeout Time: 07:11        Epidural:   Patient position:  Seated  Prep region:  Lumbar  Prep: Betadine and Patient draped    Prep comment:  X3  Location:  L3-4    Needle and Epidural Catheter:   Needle Type:  Tuohy  Needle Gauge:  18 G  Injection Technique:  Loss of resistance using saline  Attempts:  1  Catheter Size:  20 G  Catheter at Skin Depth (cm):  13  Depth in Epidural Space (cm):  8  Events: no blood with aspiration, no cerebrospinal fluid with aspiration, no paresthesia and negative aspiration test    Test Dose:  Lidocaine 1.5% w/ epi and negative (3 cc's @ 0711)    Assessment:   Catheter Secured:  Tegaderm and tape  Insertion:  Uncomplicated  Patient tolerance:  Patient tolerated the procedure well with no immediate complications  Patient still not comfortable with NADINE. Agreed for replacement. Place without difficulty.

## 2018-01-06 NOTE — PROGRESS NOTES
Mother doing well. Up without complaints. Voiding without problems. Pain managed well with Motrin. Bonding well with baby. No questions or concerns at this time.

## 2018-01-07 LAB
HCT VFR BLD AUTO: 33.2 % (ref 35–45)
HGB BLD-MCNC: 11 G/DL (ref 12–16)

## 2018-01-07 PROCEDURE — 36415 COLL VENOUS BLD VENIPUNCTURE: CPT | Performed by: ADVANCED PRACTICE MIDWIFE

## 2018-01-07 PROCEDURE — 85014 HEMATOCRIT: CPT | Performed by: ADVANCED PRACTICE MIDWIFE

## 2018-01-07 PROCEDURE — 85018 HEMOGLOBIN: CPT | Performed by: ADVANCED PRACTICE MIDWIFE

## 2018-01-07 PROCEDURE — 65270000029 HC RM PRIVATE

## 2018-01-07 RX ADMIN — LEVOTHYROXINE SODIUM 50 MCG: 50 TABLET ORAL at 08:00

## 2018-01-07 NOTE — ROUTINE PROCESS
TRANSFER - IN REPORT:    Verbal report received from [facility] on Krupa Mendoza  being received from  for routine progression of care      Report consisted of patients Situation, Background, Assessment and   Recommendations(SBAR). Information from the following report(s) SBAR, Kardex, Intake/Output, MAR and Recent Results was reviewed with the receiving nurse. Opportunity for questions and clarification was provided. Assessment completed upon patients arrival to unit and care assumed.

## 2018-01-07 NOTE — ROUTINE PROCESS
Bedside shift change report given to Yuri Luong RN (oncoming nurse) by Stefan Krishnan RN (offgoing nurse). Report included the following information SBAR, Kardex, Procedure Summary, Intake/Output, MAR and Recent Results.

## 2018-01-07 NOTE — ROUTINE PROCESS
Hand off report given from [address] [facility], RN. Care assumed. 1500--Assessment completed. Vitals stable. Educated patient on normal bleeding and when to call nurse for assistance. Fundus firm, lochia small. Patient declines hourly rounding and states she will call nurse for assistance. 1800--Mother doing well. Up without complaints. Voiding without problems. Pain managed well without pain medication. Bonding well with baby.

## 2018-01-07 NOTE — PROGRESS NOTES
PPD # 1    Patient doing well post-partum without significant complaint. Voiding without difficulty, normal lochia. Breastfeeding well but nipples are bruised and damaged. Unable to view under tongue but probable upper frenulum. Discussed seeing plastic surgeon or dentist for repair. Lurdes Rodriguez ( 2 yo ) had frenulum that caused nipple damage and mastitis, so advocating for assistance. MB nurse informed- call to peds. Will give info for referrals. Baby stable. Vitals:  Patient Vitals for the past 8 hrs:   BP Temp Pulse Resp SpO2   18 0824 115/78 98.7 °F (37.1 °C) 80 18 -   18 0418 115/79 98 °F (36.7 °C) 79 16 98 %     Temp (24hrs), Av.2 °F (36.8 °C), Min:97.9 °F (36.6 °C), Max:98.7 °F (37.1 °C)      Vital signs stable, afebrile. Exam:  Patient without distress. Breasts intact and nontender               Abdomen soft, fundus firm at level of umbilicus, nontender               Perineum with normal lochia noted. Lower extremities are negative for swelling, cords or tenderness. Lab/Data Review:  CBC:   Lab Results   Component Value Date/Time    HGB 11.0 (L) 2018 06:40 AM    HCT 33.2 (L) 2018 06:40 AM       Assessment and Plan:  Patient appears to be having uncomplicated post-partum course. Continue routine perineal care and maternal education. Plan discharge tomorrow if no problems occur.     Cherelle Jones CNM  2018  9:34 AM

## 2018-01-07 NOTE — ANESTHESIA POSTPROCEDURE EVALUATION
Post-Anesthesia Evaluation and Assessment    Patient: Taran Montoya MRN: [id number]  SSN: [id number]    YOB: 1985  Age: 34 y.o. Sex: female       Cardiovascular Function/Vital Signs  Visit Vitals    /78    Pulse 80    Temp 37.1 °C (98.7 °F)    Resp 18    Ht 5' 1\" (1.549 m)    Wt 61.2 kg (135 lb)    SpO2 98%    Breastfeeding Unknown    BMI 25.51 kg/m2       Patient is status post No value filed. anesthesia for * No procedures listed *. Nausea/Vomiting: None    Postoperative hydration per nursing. Pain:  Pain Scale 1: Numeric (0 - 10) (01/07/18 0824)  Pain Intensity 1: 2 (01/07/18 0418)   Managed    Neurological Status:   Neuro (WDL): Within Defined Limits (01/06/18 0730)   At baseline, denies headache or parasthesia    Mental Status and Level of Consciousness: Alert and oriented     Pulmonary Status:   O2 Device: Room air (01/07/18 0418)   Adequate oxygenation and airway patent    Complications related to anesthesia: None    Post-anesthesia assessment completed. No concerns. Patient says second NADINE worked well for labor but did not cover so well for delivery although she feels it may have been baby's presentation that contributed to this.  Is satisfied with NADINE not being optimal.    Signed By: Arabella Barrow CRNA     January 7, 2018

## 2018-01-08 VITALS
WEIGHT: 135 LBS | DIASTOLIC BLOOD PRESSURE: 86 MMHG | HEIGHT: 61 IN | SYSTOLIC BLOOD PRESSURE: 122 MMHG | BODY MASS INDEX: 25.49 KG/M2 | RESPIRATION RATE: 17 BRPM | OXYGEN SATURATION: 99 % | HEART RATE: 73 BPM | TEMPERATURE: 98.2 F

## 2018-01-08 RX ORDER — IBUPROFEN 800 MG/1
800 TABLET ORAL
Qty: 30 TAB | Refills: 0 | Status: SHIPPED | OUTPATIENT
Start: 2018-01-08

## 2018-01-08 RX ADMIN — LEVOTHYROXINE SODIUM 50 MCG: 50 TABLET ORAL at 07:21

## 2018-01-08 NOTE — ROUTINE PROCESS
Bedside shift change report given to Daniel Heller RN (oncoming nurse) by Peña Fulton RN (offgoing nurse). Report included the following information SBAR, Kardex, Procedure Summary, Intake/Output, MAR and Recent Results.

## 2018-01-08 NOTE — DISCHARGE INSTRUCTIONS

## 2018-01-08 NOTE — DISCHARGE SUMMARY
Obstetrical Discharge Summary     Name: Thaddeus Arguello MRN: [id number]  SSN: [id number]    YOB: 1985  Age: 34 y.o. Sex: female      Admit Date: 2018    Discharge Date: 2018     Admitting Physician: Sachin Knutson MD     Attending Physician:  Bella Power MD     Admission Diagnoses: Labor and delivery indication for care or intervention    Discharge Diagnoses:   Information for the patient's :  Arcadio Olmos [[id number]]   Delivery of a 2.74 kg female infant via Vaginal, Spontaneous Delivery on 2018 at 9:46 AM  by . Apgars were 9 and 9. Additional Diagnoses:   Hospital Problems  Date Reviewed: 2018          Codes Class Noted POA    * (Principal)Postpartum examination following vaginal delivery ICD-10-CM: Z39.2  ICD-9-CM: V24.2  2018 No           No results found for: RUBELLAEXT, GRBSEXT    Immunization(s): There is no immunization history for the selected administration types on file for this patient. Labs: No results found for this or any previous visit (from the past 24 hour(s)). Hgb 11.2 -> 11.0    Exam:  Alert and oriented, no apparent distress  Breasts nontender to palpation, nipples bruised bilaterally without masses, or evidence of infection  Firm fundus well below umbilicus, nontender to palpation  Lower extremities bilaterally without tenderness, swelling, or redness    Hospital Course: 29yo [id number] PPD#2 s/p  of living female infant, Apgars 9/9 without complication at 32.1PWV after admited at 4cm in labor. She delivered  at 0946. Postpartum, patient is ambulating, tolerating regular diet, voiding, minimal lochia. Plastic surgeon, Dr. Deb Rios, consulted for suspected anterior anklyoglossia as patient having problems with painful breastfeeding and nipple bruising. She denies any complaints and desires to go home after infant evaluation. Discharge home today, follow up for PPV. Discharge instructions reviewed.   She has hypothyroidism and will continue Levothyroxine 50mcg. Patient Instructions:   Current Discharge Medication List      START taking these medications    Details   ibuprofen (MOTRIN) 800 mg tablet Take 1 Tab by mouth every eight (8) hours as needed. Qty: 30 Tab, Refills: 0         CONTINUE these medications which have NOT CHANGED    Details   levothyroxine (SYNTHROID) 50 mcg tablet Take  by mouth Daily (before breakfast). PNV38-Iron Cbn&Gluc-FA-DSS-DHA 35-1- mg cmpk Take  by mouth. Reference my discharge instructions. Follow-up Appointments   Procedures    FOLLOW UP VISIT Appointment in: 6 Weeks Follow up [facility] in 4-6wks for postpartum visit or sooner as needed. Follow up [facility] in 4-6wks for postpartum visit or sooner as needed.      Standing Status:   Standing     Number of Occurrences:   1     Order Specific Question:   Appointment in     Answer:   6 Weeks        Signed By:  Alvan Landau, MD     January 8, 2018

## 2018-01-08 NOTE — ROUTINE PROCESS
Mother doing well. Up without complaints. Voiding without problems. No complaints of pain. Pt refused pain medication throughout shift, but is aware it is available when needed. Mom bonding well with baby.

## 2018-01-08 NOTE — ROUTINE PROCESS
Verbal shift change report given to Anisa Joshua RN (oncoming nurse) by Burke Castanon. Natalia Shah RN (offgoing nurse). Report included the following information Kardex, Intake/Output, MAR and Recent Results. 0945--assessment done--discharge planned for today after Dr Leonor Garza sees the baby for tight upper lip and possible procedure to release--voiding without difficulty--no weakness when up--has not had BM--high fiber/high fluid diet encouraged--effective pain management with Motrin--baby sucks well at the breast, but Mom's nipples are abrased and sore--using Lansinoh--POC for discharge discussed--verbalizes understanding--comfortable. 1300--patient has decided to have her baby seen by Dr Leonor Garza on an outpatient basis--discharge ongoing. 1340--discharge instructions reviewed and signed. 1430--discharged via wheelchair with baby in car seat carrier. Baby transported home in a car seat.

## 2018-01-08 NOTE — LACTATION NOTE
Mother states baby has been nursing well but her nipples are damaged/bruised from the baby's lip tie. Discussed positioning and signs of a good latch. Dr. Garrett Barakat is coming to evaluate. General discussion. Offered assistance if needed.

## 2018-01-08 NOTE — PROGRESS NOTES
Baby announcement.      [address] [facility]   Staff [address] [facility]   [phone]

## 2018-07-17 ENCOUNTER — OFFICE VISIT (OUTPATIENT)
Dept: FAMILY MEDICINE CLINIC | Age: 33
End: 2018-07-17

## 2018-07-17 VITALS
HEIGHT: 61 IN | OXYGEN SATURATION: 92 % | SYSTOLIC BLOOD PRESSURE: 112 MMHG | HEART RATE: 89 BPM | DIASTOLIC BLOOD PRESSURE: 84 MMHG | RESPIRATION RATE: 16 BRPM | WEIGHT: 112 LBS | BODY MASS INDEX: 21.14 KG/M2 | TEMPERATURE: 95.8 F

## 2018-07-17 DIAGNOSIS — J01.10 ACUTE NON-RECURRENT FRONTAL SINUSITIS: Primary | ICD-10-CM

## 2018-07-17 RX ORDER — PREDNISONE 20 MG/1
40 TABLET ORAL
Qty: 10 TAB | Refills: 0 | Status: SHIPPED | OUTPATIENT
Start: 2018-07-17 | End: 2018-07-22

## 2018-07-17 RX ORDER — LEVOTHYROXINE SODIUM 50 UG/1
TABLET ORAL
COMMUNITY
Start: 2018-05-11 | End: 2018-10-24 | Stop reason: SDUPTHER

## 2018-07-17 NOTE — PROGRESS NOTES
Rm 8  Pt presents to the clinic complaining of a headache, sore throat and sinus pressure and pain x 3 days. Depression Screening:  PHQ over the last two weeks 7/17/2018   Little interest or pleasure in doing things Not at all   Feeling down, depressed or hopeless Not at all   Total Score PHQ 2 0       Learning Assessment:  Learning Assessment 7/17/2018   PRIMARY LEARNER Patient   HIGHEST LEVEL OF EDUCATION - PRIMARY LEARNER  4 YEARS OF COLLEGE   BARRIERS PRIMARY LEARNER NONE   CO-LEARNER CAREGIVER No   PRIMARY LANGUAGE ENGLISH   LEARNER PREFERENCE PRIMARY DEMONSTRATION   ANSWERED BY Patient   RELATIONSHIP SELF       Abuse Screening:  No flowsheet data found. Health Maintenance reviewed and discussed per provider: yes     Coordination of Care:    1. Have you been to the ER, urgent care clinic since your last visit? Hospitalized since your last visit? no    2. Have you seen or consulted any other health care providers outside of the [address] [place] [address] since your last visit? Include any pap smears or colon screening.  no

## 2018-07-17 NOTE — MR AVS SNAPSHOT
[address] [place] [address] 83 [id number] 
[phone] Patient: Alexis Nazario MRN: [id number] :1985 Visit Information Date & Time Provider Department Dept. Phone Encounter #  
 2018 12:45 PM Joshua Hendersno PA-C [facility] [phone] [id number] Upcoming Health Maintenance Date Due DTaP/Tdap/Td series (1 - Tdap) 2005 PAP AKA CERVICAL CYTOLOGY 2005 Influenza Age 5 to Adult 2018 Allergies as of 2018  Review Complete On: 2018 By: Joshua Henderson PA-C No Known Allergies Current Immunizations  Never Reviewed No immunizations on file. Not reviewed this visit You Were Diagnosed With   
  
 Codes Comments Acute non-recurrent frontal sinusitis    -  Primary ICD-10-CM: J01.10 ICD-9-CM: 030.8 Vitals BP Pulse Temp Resp Height(growth percentile) Weight(growth percentile) 112/84 (BP 1 Location: Right arm, BP Patient Position: Sitting) 89 95.8 °F (35.4 °C) (Oral) 16 5' 1\" (1.549 m) 112 lb (50.8 kg) SpO2 BMI OB Status Smoking Status 92% 21.16 kg/m2 Breastfeeding Never Smoker Vitals History BMI and BSA Data Body Mass Index Body Surface Area  
 21.16 kg/m 2 1.48 m 2 Preferred Pharmacy Pharmacy Name Phone [facility]ACY #[id number] Tomasa Thompson [phone] Your Updated Medication List  
  
   
This list is accurate as of 18  1:14 PM.  Always use your most recent med list.  
  
  
  
  
 levothyroxine 50 mcg tablet Commonly known as:  SYNTHROID  
  
 predniSONE 20 mg tablet Commonly known as:  Rangel Lever Take 2 Tabs by mouth daily (with breakfast) for 5 days. Prescriptions Sent to Pharmacy Refills  
 predniSONE (DELTASONE) 20 mg tablet 0 Sig: Take 2 Tabs by mouth daily (with breakfast) for 5 days.   
 Class: Normal  
 Pharmacy: [facility]/pharmacy [id number] [facility], [address] [place] [address] #: [phone] Route: Oral  
  
Patient Instructions Try a decongestant for a few days:  
Phenylephrine is in most cold/cough/allergy preparations, but tends to be weaker than Pseudoephedrine (which you have to ask the pharmacist for). Both should be safe with breast feeding. Nasal steroids are another option (Flonase, Nasonex, etc.). If the decongestants aren't working, try the prednisone. Avoid using NSAIDs (ibuprofen, Aleve, Asprin, naproxen, etc) with prednisone. It is safe to take with tylenol. Sinusitis: Care Instructions Your Care Instructions Sinusitis is an infection of the lining of the sinus cavities in your head. Sinusitis often follows a cold. It causes pain and pressure in your head and face. In most cases, sinusitis gets better on its own in 1 to 2 weeks. But some mild symptoms may last for several weeks. Sometimes antibiotics are needed. Follow-up care is a key part of your treatment and safety. Be sure to make and go to all appointments, and call your doctor if you are having problems. It's also a good idea to know your test results and keep a list of the medicines you take. How can you care for yourself at home? · Take an over-the-counter pain medicine, such as acetaminophen (Tylenol), ibuprofen (Advil, Motrin), or naproxen (Aleve). Read and follow all instructions on the label. · If the doctor prescribed antibiotics, take them as directed. Do not stop taking them just because you feel better. You need to take the full course of antibiotics. · Be careful when taking over-the-counter cold or flu medicines and Tylenol at the same time. Many of these medicines have acetaminophen, which is Tylenol. Read the labels to make sure that you are not taking more than the recommended dose. Too much acetaminophen (Tylenol) can be harmful. · Breathe warm, moist air from a steamy shower, a hot bath, or a sink filled with hot water. Avoid cold, dry air. Using a humidifier in your home may help. Follow the directions for cleaning the machine. · Use saline (saltwater) nasal washes to help keep your nasal passages open and wash out mucus and bacteria. You can buy saline nose drops at a grocery store or drugstore. Or you can make your own at home by adding 1 teaspoon of salt and 1 teaspoon of baking soda to 2 cups of distilled water. If you make your own, fill a bulb syringe with the solution, insert the tip into your nostril, and squeeze gently. Teola Guerita your nose. · Put a hot, wet towel or a warm gel pack on your face 3 or 4 times a day for 5 to 10 minutes each time. · Try a decongestant nasal spray like oxymetazoline (Afrin). Do not use it for more than 3 days in a row. Using it for more than 3 days can make your congestion worse. When should you call for help? Call your doctor now or seek immediate medical care if: 
  · You have new or worse swelling or redness in your face or around your eyes.  
  · You have a new or higher fever.  
 Watch closely for changes in your health, and be sure to contact your doctor if: 
  · You have new or worse facial pain.  
  · The mucus from your nose becomes thicker (like pus) or has new blood in it.  
  · You are not getting better as expected. Where can you learn more? Go to http://gene-layton.info/. Enter K747 in the search box to learn more about \"Sinusitis: Care Instructions. \" Current as of: May 12, 2017 Content Version: 11.7 © 9865-5846 Policard. Care instructions adapted under license by [employer] (which disclaims liability or warranty for this information).  If you have questions about a medical condition or this instruction, always ask your healthcare professional. [address] any warranty or liability for your use of this information. Oral Corticosteroids: Care Instructions Your Care Instructions Oral corticosteroids are commonly used medicines. They help calm down the body's response to inflammation. Oral means that they are taken by mouth. This is most often in the form of a pill. They are used for treating many conditions. You may take them for asthma, COPD, back pain, or allergic reactions. They are also used for other conditions such as autoimmune diseases and certain types of cancer. You may have side effects from taking this medicine. These include nausea, headache, dizziness, and anxiety. Pregnant women should not take this medicine unless their doctor tells them to. Follow your doctor's instructions on how to take this medicine. If you are taking it for 2 weeks or more, your doctor may give you special instructions to slowly reduce (taper) the amount you take. Slowly cutting down on the medicine over time helps your body adjust to the change. Follow-up care is a key part of your treatment and safety. Be sure to make and go to all appointments, and call your doctor if you are having problems. It's also a good idea to know your test results and keep a list of the medicines you take. How can you care for yourself at home? · Be safe with medicines. Take your medicines exactly as prescribed. Call your doctor if you think you are having a problem with your medicine. You will get more details on the specific medicines your doctor prescribes. · Take your medicine after a meal. It may cause nausea if you take it on an empty stomach. · Avoid taking nonsteroidal anti-inflammatory drugs (NSAIDs) while you are taking oral corticosteroids. Taking both of these medicines might cause an upset stomach. NSAIDs include ibuprofen (Advil, Motrin) and naproxen (Aleve). · If you have a history of stomach ulcers, you may want to avoid taking this medicine and an NSAID at the same time. This can cause stomach upset or bleeding. · Follow your doctor's instructions for how to stop taking this medicine. You may need to taper it. This means the medicine should be slowly reduced. Do not stop taking the medicine all at once. When should you call for help? Call 911 if: 
  · You vomit blood or what looks like coffee grounds.  
 Call your doctor now or seek immediate medical care if: 
  · Your symptoms are getting worse.  
  · You are dizzy or lightheaded, or you feel like you may faint.  
  · You have new or worse nausea or vomiting.  
  · You have stomach pain that is getting worse.  
  · Your stools are black.  
 Watch closely for changes in your health, and be sure to contact your doctor if: 
  · You do not get better as expected. Where can you learn more? Go to http://gene-layton.info/. Enter S136 in the search box to learn more about \"Oral Corticosteroids: Care Instructions. \" Current as of: December 6, 2017 Content Version: 11.7 © 1693-1785 Newsreps. Care instructions adapted under license by [employer] (which disclaims liability or warranty for this information). If you have questions about a medical condition or this instruction, always ask your healthcare professional. [address] any warranty or liability for your use of this information. http://fauquierent. Freedom Meditech. com/2011/02/baby-shampoo-used-to-treat-chronic.html The mixture ratio is 1/2 teaspoon of Johnsons No-Tears Baby Shampoo in 8 ounces of saltwater rinsed into the nose using a sinus rinse bottle can help with chronic sinusitis, especially when there's a lot of crusting present and to which all other therapies have failed to resolve. Corin.de Saline Nasal Washes: Care Instructions Your Care Instructions Saline nasal washes help keep the nasal passages open by washing out thick or dried mucus. This simple remedy can help relieve symptoms of allergies, sinusitis, and colds. It also can make the nose feel more comfortable by keeping the mucous membranes moist. You may notice a little burning sensation in your nose the first few times you use the solution, but this usually gets better in a few days. Follow-up care is a key part of your treatment and safety. Be sure to make and go to all appointments, and call your doctor if you are having problems. It's also a good idea to know your test results and keep a list of the medicines you take. How can you care for yourself at home? · You can buy premixed saline solution in a squeeze bottle or other sinus rinse products at a drugstore. Read and follow the instructions on the label. · You also can make your own saline solution by adding 1 teaspoon of salt and 1 teaspoon of baking soda to 2 cups of distilled water. · If you use a homemade solution, pour a small amount into a clean bowl. Using a rubber bulb syringe, squeeze the syringe and place the tip in the salt water. Pull a small amount of the salt water into the syringe by relaxing your hand. · Sit down with your head tilted slightly back. Do not lie down. Put the tip of the bulb syringe or the squeeze bottle a little way into one of your nostrils. Gently drip or squirt a few drops into the nostril. Repeat with the other nostril. Some sneezing and gagging are normal at first. 
· Gently blow your nose. · Wipe the syringe or bottle tip clean after each use. · Repeat this 2 or 3 times a day. · Use nasal washes gently if you have nosebleeds often. When should you call for help? Watch closely for changes in your health, and be sure to contact your doctor if: 
  · You often get nosebleeds.  
  · You have problems doing the nasal washes. Where can you learn more? Go to http://gene-layton.info/. Enter [id number] in the search box to learn more about \"Saline Nasal Washes: Care Instructions. \" Current as of: May 12, 2017 Content Version: 11.7 © 4506-2472 Paice, Incorporated. Care instructions adapted under license by [employer] (which disclaims liability or warranty for this information). If you have questions about a medical condition or this instruction, always ask your healthcare professional. [address] any warranty or liability for your use of this information. Introducing [facility]! Mary Murphy introduces Ventiva patient portal. Now you can access parts of your medical record, email your doctor's office, and request medication refills online. 1. In your internet browser, go to [email or web address] 2. Click on the First Time User? Click Here link in the Sign In box. You will see the New Member Sign Up page. 3. Enter your Ventiva Access Code exactly as it appears below. You will not need to use this code after youve completed the sign-up process. If you do not sign up before the expiration date, you must request a new code. · Ventiva Access Code: [email or web address] Expires: 10/15/2018  1:14 PM 
 
4. Enter the last four digits of your Social Security Number (xxxx) and Date of Birth (mm/dd/yyyy) as indicated and click Submit. You will be taken to the next sign-up page. 5. Create a Ventiva ID. This will be your Ventiva login ID and cannot be changed, so think of one that is secure and easy to remember. 6. Create a Ventiva password. You can change your password at any time. 7. Enter your Password Reset Question and Answer. This can be used at a later time if you forget your password. 8. Enter your e-mail address. You will receive e-mail notification when new information is available in [address] 9. Click Sign Up. You can now view and download portions of your medical record. 10. Click the Download Summary menu link to download a portable copy of your medical information. If you have questions, please visit the Frequently Asked Questions section of the Dermira website. Remember, Dermira is NOT to be used for urgent needs. For medical emergencies, dial 911. Now available from your iPhone and Android! Please provide this summary of care documentation to your next provider. If you have any questions after today's visit, please call [phone].

## 2018-07-17 NOTE — PROGRESS NOTES
HISTORY OF PRESENT ILLNESS  Misty Montoya is a 28 y.o. female. HPI Comments: Pt presents with c/o 8/10 frontal head pain that's worse with movement, bending over, and coughing. She reports a history of migraines, but states she hasn't gotten one since she had kids. She also notes that her migraines were characteristically one-sided, whereas this pain is right across her forehead. She tried some ibuprofen with no relief, and OTC migraine medicine (acetaminophen/aspirin/caffeine) that works briefly, then wears off quickly. Denies any history of allergies; denies any sick children at home. She is currently breast feeding. Sore Throat    Associated symptoms include headaches and cough. Pertinent negatives include no vomiting, no congestion and no ear pain. Headache   Associated symptoms include headaches. Pertinent negatives include no abdominal pain. Sinus Pain    Associated symptoms include sore throat (more of a tickle), cough and headaches. Pertinent negatives include no chills, no congestion and no ear pain. Review of Systems   Constitutional: Positive for malaise/fatigue. Negative for chills and fever. HENT: Positive for sinus pain and sore throat (more of a tickle). Negative for congestion, ear pain and tinnitus. Eyes: Positive for photophobia. Negative for blurred vision and double vision. + floaters   Respiratory: Positive for cough. Gastrointestinal: Positive for nausea. Negative for abdominal pain and vomiting. Musculoskeletal: Positive for myalgias (diffuse). Skin: Negative for rash. Neurological: Positive for headaches. Negative for dizziness. Visit Vitals    /84 (BP 1 Location: Right arm, BP Patient Position: Sitting)    Pulse 89    Temp 95.8 °F (35.4 °C) (Oral)    Resp 16    Ht 5' 1\" (1.549 m)    Wt 112 lb (50.8 kg)    SpO2 92%    BMI 21.16 kg/m2       Physical Exam   Constitutional: She is oriented to person, place, and time.  Vital signs are normal. She appears well-developed and well-nourished. She is cooperative. She does not appear ill. No distress. HENT:   Head: Normocephalic and atraumatic. Right Ear: Tympanic membrane, external ear and ear canal normal.   Left Ear: Tympanic membrane, external ear and ear canal normal.   Nose: Right sinus exhibits frontal sinus tenderness. Right sinus exhibits no maxillary sinus tenderness. Left sinus exhibits frontal sinus tenderness. Left sinus exhibits no maxillary sinus tenderness. Mouth/Throat: Uvula is midline, oropharynx is clear and moist and mucous membranes are normal. No oropharyngeal exudate or posterior oropharyngeal edema. Eyes: Conjunctivae are normal.   Neck: Neck supple. Cardiovascular: Normal rate, regular rhythm and normal heart sounds. Exam reveals no gallop and no friction rub. No murmur heard. Pulses:       Radial pulses are 2+ on the right side, and 2+ on the left side. Pulmonary/Chest: Effort normal and breath sounds normal. She has no decreased breath sounds. She has no wheezes. She has no rhonchi. She has no rales. Lymphadenopathy:     She has no cervical adenopathy. Neurological: She is alert and oriented to person, place, and time. Skin: Skin is warm and dry. Nursing note and vitals reviewed. ASSESSMENT and PLAN    ICD-10-CM ICD-9-CM    1. Acute non-recurrent frontal sinusitis J01.10 461. 1 predniSONE (DELTASONE) 20 mg tablet     Pt indicates she wishes to try decongestants for a few days, then if it's not helping she'll take the prednisone. Recommended she take her daily dose right after breast feeding to minimize exposure to the baby (though it should be fairly safe), which she reports feeding every 3 hours. Provided after-visit information on  Sinusitis, sinus rinse, and oral steroids. Reviewed reasons to return or seek emergency care. Pt verbalized understanding and agreement with the plan of care.     [facility], [place]

## 2018-07-17 NOTE — PATIENT INSTRUCTIONS
Try a decongestant for a few days:   Phenylephrine is in most cold/cough/allergy preparations, but tends to be weaker than   Pseudoephedrine (which you have to ask the pharmacist for). Both should be safe with breast feeding. Nasal steroids are another option (Flonase, Nasonex, etc.). If the decongestants aren't working, try the prednisone. Avoid using NSAIDs (ibuprofen, Aleve, Asprin, naproxen, etc) with prednisone. It is safe to take with tylenol. Sinusitis: Care Instructions  Your Care Instructions    Sinusitis is an infection of the lining of the sinus cavities in your head. Sinusitis often follows a cold. It causes pain and pressure in your head and face. In most cases, sinusitis gets better on its own in 1 to 2 weeks. But some mild symptoms may last for several weeks. Sometimes antibiotics are needed. Follow-up care is a key part of your treatment and safety. Be sure to make and go to all appointments, and call your doctor if you are having problems. It's also a good idea to know your test results and keep a list of the medicines you take. How can you care for yourself at home? · Take an over-the-counter pain medicine, such as acetaminophen (Tylenol), ibuprofen (Advil, Motrin), or naproxen (Aleve). Read and follow all instructions on the label. · If the doctor prescribed antibiotics, take them as directed. Do not stop taking them just because you feel better. You need to take the full course of antibiotics. · Be careful when taking over-the-counter cold or flu medicines and Tylenol at the same time. Many of these medicines have acetaminophen, which is Tylenol. Read the labels to make sure that you are not taking more than the recommended dose. Too much acetaminophen (Tylenol) can be harmful. · Breathe warm, moist air from a steamy shower, a hot bath, or a sink filled with hot water. Avoid cold, dry air. Using a humidifier in your home may help.  Follow the directions for cleaning the machine. · Use saline (saltwater) nasal washes to help keep your nasal passages open and wash out mucus and bacteria. You can buy saline nose drops at a grocery store or drugstore. Or you can make your own at home by adding 1 teaspoon of salt and 1 teaspoon of baking soda to 2 cups of distilled water. If you make your own, fill a bulb syringe with the solution, insert the tip into your nostril, and squeeze gently. Lorn Hush your nose. · Put a hot, wet towel or a warm gel pack on your face 3 or 4 times a day for 5 to 10 minutes each time. · Try a decongestant nasal spray like oxymetazoline (Afrin). Do not use it for more than 3 days in a row. Using it for more than 3 days can make your congestion worse. When should you call for help? Call your doctor now or seek immediate medical care if:    · You have new or worse swelling or redness in your face or around your eyes.     · You have a new or higher fever.    Watch closely for changes in your health, and be sure to contact your doctor if:    · You have new or worse facial pain.     · The mucus from your nose becomes thicker (like pus) or has new blood in it.     · You are not getting better as expected. Where can you learn more? Go to http://gene-layton.info/. Enter H622 in the search box to learn more about \"Sinusitis: Care Instructions. \"  Current as of: May 12, 2017  Content Version: 11.7  © 6492-3591 [employer]. Care instructions adapted under license by [employer] (which disclaims liability or warranty for this information). If you have questions about a medical condition or this instruction, always ask your healthcare professional. [place] [address] any warranty or liability for your use of this information. Oral Corticosteroids: Care Instructions  Your Care Instructions    Oral corticosteroids are commonly used medicines. They help calm down the body's response to inflammation.  Oral means that they are taken by mouth. This is most often in the form of a pill. They are used for treating many conditions. You may take them for asthma, COPD, back pain, or allergic reactions. They are also used for other conditions such as autoimmune diseases and certain types of cancer. You may have side effects from taking this medicine. These include nausea, headache, dizziness, and anxiety. Pregnant women should not take this medicine unless their doctor tells them to. Follow your doctor's instructions on how to take this medicine. If you are taking it for 2 weeks or more, your doctor may give you special instructions to slowly reduce (taper) the amount you take. Slowly cutting down on the medicine over time helps your body adjust to the change. Follow-up care is a key part of your treatment and safety. Be sure to make and go to all appointments, and call your doctor if you are having problems. It's also a good idea to know your test results and keep a list of the medicines you take. How can you care for yourself at home? · Be safe with medicines. Take your medicines exactly as prescribed. Call your doctor if you think you are having a problem with your medicine. You will get more details on the specific medicines your doctor prescribes. · Take your medicine after a meal. It may cause nausea if you take it on an empty stomach. · Avoid taking nonsteroidal anti-inflammatory drugs (NSAIDs) while you are taking oral corticosteroids. Taking both of these medicines might cause an upset stomach. NSAIDs include ibuprofen (Advil, Motrin) and naproxen (Aleve). · If you have a history of stomach ulcers, you may want to avoid taking this medicine and an NSAID at the same time. This can cause stomach upset or bleeding. · Follow your doctor's instructions for how to stop taking this medicine. You may need to taper it. This means the medicine should be slowly reduced. Do not stop taking the medicine all at once.   When should you call for help? Call 911 if:    · You vomit blood or what looks like coffee grounds.    Call your doctor now or seek immediate medical care if:    · Your symptoms are getting worse.     · You are dizzy or lightheaded, or you feel like you may faint.     · You have new or worse nausea or vomiting.     · You have stomach pain that is getting worse.     · Your stools are black.    Watch closely for changes in your health, and be sure to contact your doctor if:    · You do not get better as expected. Where can you learn more? Go to http://gene-layton.info/. Enter F938 in the search box to learn more about \"Oral Corticosteroids: Care Instructions. \"  Current as of: December 6, 2017  Content Version: 11.7  © 7810-8384 [employer]. Care instructions adapted under license by [employer] (which disclaims liability or warranty for this information). If you have questions about a medical condition or this instruction, always ask your healthcare professional. [place] [address] any warranty or liability for your use of this information. http://Equiphonquierent. [employer]. [employer]/2011/02/baby-shampoo-used-to-treat-chronic.html    The mixture ratio is 1/2 teaspoon of Johnsons No-Tears Baby Shampoo in 8 ounces of saltwater rinsed into the nose using a sinus rinse bottle can help with chronic sinusitis, especially when there's a lot of crusting present and to which all other therapies have failed to resolve. [employer][email or web address]           Saline Nasal Washes: Care Instructions  Your Care Instructions  Saline nasal washes help keep the nasal passages open by washing out thick or dried mucus. This simple remedy can help relieve symptoms of allergies, sinusitis, and colds.  It also can make the nose feel more comfortable by keeping the mucous membranes moist. You may notice a little burning sensation in your nose the first few times you use the solution, but this usually gets better in a few days. Follow-up care is a key part of your treatment and safety. Be sure to make and go to all appointments, and call your doctor if you are having problems. It's also a good idea to know your test results and keep a list of the medicines you take. How can you care for yourself at home? · You can buy premixed saline solution in a squeeze bottle or other sinus rinse products at a drugstore. Read and follow the instructions on the label. · You also can make your own saline solution by adding 1 teaspoon of salt and 1 teaspoon of baking soda to 2 cups of distilled water. · If you use a homemade solution, pour a small amount into a clean bowl. Using a rubber bulb syringe, squeeze the syringe and place the tip in the salt water. Pull a small amount of the salt water into the syringe by relaxing your hand. · Sit down with your head tilted slightly back. Do not lie down. Put the tip of the bulb syringe or the squeeze bottle a little way into one of your nostrils. Gently drip or squirt a few drops into the nostril. Repeat with the other nostril. Some sneezing and gagging are normal at first.  · Gently blow your nose. · Wipe the syringe or bottle tip clean after each use. · Repeat this 2 or 3 times a day. · Use nasal washes gently if you have nosebleeds often. When should you call for help? Watch closely for changes in your health, and be sure to contact your doctor if:    · You often get nosebleeds.     · You have problems doing the nasal washes. Where can you learn more? Go to http://gene-layton.info/. Enter [id number] in the search box to learn more about \"Saline Nasal Washes: Care Instructions. \"  Current as of: May 12, 2017  Content Version: 11.7  © 2597-7726 [employer].  Care instructions adapted under license by [employer] (which disclaims liability or warranty for this information). If you have questions about a medical condition or this instruction, always ask your healthcare professional. [address] any warranty or liability for your use of this information.

## 2018-08-29 ENCOUNTER — HOSPITAL ENCOUNTER (OUTPATIENT)
Dept: MRI IMAGING | Age: 33
Discharge: HOME | End: 2018-08-29
Attending: INTERNAL MEDICINE
Payer: COMMERCIAL

## 2018-08-29 DIAGNOSIS — A87.9: ICD-10-CM

## 2018-08-29 DIAGNOSIS — H53.9 CHANGE IN VISION: ICD-10-CM

## 2018-08-29 DIAGNOSIS — R51.9 HEAD ACHE: ICD-10-CM

## 2018-08-29 PROCEDURE — 70551 MRI BRAIN STEM W/O DYE: CPT

## 2018-10-24 ENCOUNTER — OFFICE VISIT (OUTPATIENT)
Dept: FAMILY MEDICINE CLINIC | Age: 33
End: 2018-10-24

## 2018-10-24 VITALS
WEIGHT: 108.8 LBS | OXYGEN SATURATION: 99 % | DIASTOLIC BLOOD PRESSURE: 89 MMHG | HEIGHT: 61 IN | BODY MASS INDEX: 20.54 KG/M2 | SYSTOLIC BLOOD PRESSURE: 101 MMHG | RESPIRATION RATE: 16 BRPM | HEART RATE: 74 BPM | TEMPERATURE: 99.5 F

## 2018-10-24 DIAGNOSIS — J02.9 ACUTE PHARYNGITIS, UNSPECIFIED ETIOLOGY: ICD-10-CM

## 2018-10-24 DIAGNOSIS — J06.9 VIRAL UPPER RESPIRATORY TRACT INFECTION: Primary | ICD-10-CM

## 2018-10-24 LAB
S PYO AG THROAT QL: NEGATIVE
VALID INTERNAL CONTROL?: YES

## 2018-10-24 RX ORDER — FLUTICASONE PROPIONATE 50 MCG
1 SPRAY, SUSPENSION (ML) NASAL 2 TIMES DAILY
Qty: 1 BOTTLE | Refills: 1 | Status: SHIPPED | OUTPATIENT
Start: 2018-10-24

## 2018-10-24 RX ORDER — SERTRALINE HYDROCHLORIDE 50 MG/1
TABLET, FILM COATED ORAL
COMMUNITY

## 2018-10-24 NOTE — PROGRESS NOTES
Rm 8  Patient presents to the clinic complaining of sore throat, chills ith body aches and fullness of ears that started x 2 days ago. Depression Screening:  PHQ over the last two weeks 10/24/2018 7/17/2018   Little interest or pleasure in doing things Not at all Not at all   Feeling down, depressed, irritable, or hopeless Not at all Not at all   Total Score PHQ 2 0 0       Learning Assessment:  Learning Assessment 7/17/2018   PRIMARY LEARNER Patient   HIGHEST LEVEL OF EDUCATION - PRIMARY LEARNER  4 YEARS OF COLLEGE   BARRIERS PRIMARY LEARNER NONE   CO-LEARNER CAREGIVER No   PRIMARY LANGUAGE ENGLISH   LEARNER PREFERENCE PRIMARY DEMONSTRATION   ANSWERED BY Patient   RELATIONSHIP SELF       Abuse Screening:  No flowsheet data found. Health Maintenance reviewed and discussed per provider: yes     Coordination of Care:    1. Have you been to the ER, urgent care clinic since your last visit? Hospitalized since your last visit? no    2. Have you seen or consulted any other health care providers outside of the [address] [place] [address] since your last visit? Include any pap smears or colon screening.  no  ]

## 2018-10-24 NOTE — PROGRESS NOTES
HISTORY OF PRESENT ILLNESS  Gallo Morgan is a 28 y.o. female. HPI  Ms. Amna Singh presents for 3 days of sore throat. She also c/o a frontal headache that is worse when she leans forward as well as body aches and chills that began in the night last night. She has a hx of viral meningitis over the summer, and c/o intermittent headaches since that time. She reports her current headache complaint to be different than her typical tension headaches. - PCP Dr. Silvestre Cline neurology appt for continued headaches s/p viral meningitis over the summer. Review of Systems   Constitutional: Positive for chills and malaise/fatigue. Negative for fever (states today's temp is high for her). HENT: Positive for congestion (with post-nasal drip), ear pain (pressure), sinus pain (frontal) and sore throat. Gastrointestinal: Positive for nausea. Negative for vomiting. /89 (BP 1 Location: Right arm, BP Patient Position: Sitting)   Pulse 74   Temp 99.5 °F (37.5 °C) (Oral)   Resp 16   Ht 5' 1\" (1.549 m)   Wt 108 lb 12.8 oz (49.4 kg)   LMP 10/13/2018 (Exact Date)   SpO2 99%   Breastfeeding? No   BMI 20.56 kg/m²     Physical Exam   Constitutional: She is oriented to person, place, and time. She appears well-developed and well-nourished. No distress. HENT:   Head: Normocephalic and atraumatic. Right Ear: Tympanic membrane, external ear and ear canal normal.   Left Ear: Tympanic membrane, external ear and ear canal normal.   Nose: Mucosal edema (with erythema) present. Right sinus exhibits no maxillary sinus tenderness and no frontal sinus tenderness. Left sinus exhibits no maxillary sinus tenderness and no frontal sinus tenderness. Mouth/Throat: Uvula is midline and mucous membranes are normal. No uvula swelling. No oropharyngeal exudate, posterior oropharyngeal edema, posterior oropharyngeal erythema or tonsillar abscesses.    1+ tonsils bilaterally without exudate   Eyes: Conjunctivae are normal. Pupils are equal, round, and reactive to light. No scleral icterus. Neck: Neck supple. Cardiovascular: Normal rate, regular rhythm and normal heart sounds. Exam reveals no gallop. No murmur heard. Pulses:       Dorsalis pedis pulses are 2+ on the right side, and 2+ on the left side. Posterior tibial pulses are 2+ on the right side, and 2+ on the left side. No pedal edema. Pulmonary/Chest: Effort normal and breath sounds normal. No respiratory distress. She has no decreased breath sounds. She has no wheezes. She has no rhonchi. She has no rales. Lymphadenopathy:        Head (right side): No submandibular and no tonsillar adenopathy present. Head (left side): No submandibular and no tonsillar adenopathy present. She has no cervical adenopathy. Right: No supraclavicular adenopathy present. Left: No supraclavicular adenopathy present. Neurological: She is alert and oriented to person, place, and time. Skin: Skin is warm and dry. Psychiatric: She has a normal mood and affect. Her speech is normal.     Results for orders placed or performed in visit on 10/24/18   AMB POC RAPID STREP A   Result Value Ref Range    VALID INTERNAL CONTROL POC Yes     Group A Strep Ag Negative Negative       ASSESSMENT and PLAN  Orders Placed This Encounter    AMB POC RAPID STREP A    fluticasone (FLONASE) 50 mcg/actuation nasal spray     [place] by Both Nostrils route two (2) times a day. Dispense:  1 Bottle     Refill:  1     Diagnoses and all orders for this visit:    1. Viral upper respiratory tract infection  -     fluticasone (FLONASE) 50 mcg/actuation nasal spray; 1 [place] by Both Nostrils route two (2) times a day. - Demonstration provided. Usage discussed. - Consistent with a viral, non-influenza type illness. Advised of supportive care measures.      2. Acute pharyngitis, unspecified etiology  -     AMB POC RAPID STREP A      Follow-up Disposition:  Return if symptoms worsen or fail to improve.

## 2019-01-15 ENCOUNTER — HOSPITAL ENCOUNTER (OUTPATIENT)
Dept: LAB | Age: 34
Discharge: HOME | End: 2019-01-15
Payer: COMMERCIAL

## 2019-01-15 PROCEDURE — 87624 HPV HI-RISK TYP POOLED RSLT: CPT

## 2019-01-15 PROCEDURE — 88175 CYTOPATH C/V AUTO FLUID REDO: CPT

## 2023-01-24 ENCOUNTER — HOSPITAL ENCOUNTER (OUTPATIENT)
Dept: LAB | Age: 38
Discharge: HOME | End: 2023-01-24
Payer: COMMERCIAL

## 2023-01-24 PROCEDURE — 88175 CYTOPATH C/V AUTO FLUID REDO: CPT

## 2023-01-24 PROCEDURE — 87624 HPV HI-RISK TYP POOLED RSLT: CPT

## (undated) DEVICE — TELFA NON-ADHERENT PADS PREPAK: Brand: TELFA

## (undated) DEVICE — TRAY PREP DRY W/ PREM GLV 2 APPL 6 SPNG 2 UNDPD 1 OVERWRAP

## (undated) DEVICE — TUBING SET BERK 6FT LF DISP -- GYRUS

## (undated) DEVICE — DEPAUL MINOR LITHOTOMY CDS: Brand: MEDLINE INDUSTRIES, INC.

## (undated) DEVICE — Z DISCONTINUED USE 2659133 CANNULA VAC DIA8MM CRV SEMI RIG W/ ROUNDED TIP TAPR END

## (undated) DEVICE — KENDALL SCD EXPRESS SLEEVES, KNEE LENGTH, MEDIUM: Brand: KENDALL SCD

## (undated) DEVICE — SOL IRR NACL 0.9% 500ML POUR --

## (undated) DEVICE — COLLECTION KT SUC TISS BERK -- GYRUS

## (undated) DEVICE — STERILE POLYISOPRENE POWDER-FREE SURGICAL GLOVES: Brand: PROTEXIS